# Patient Record
Sex: MALE | Race: WHITE
[De-identification: names, ages, dates, MRNs, and addresses within clinical notes are randomized per-mention and may not be internally consistent; named-entity substitution may affect disease eponyms.]

---

## 2017-03-20 ENCOUNTER — HOSPITAL ENCOUNTER (INPATIENT)
Dept: HOSPITAL 42 - ED | Age: 62
LOS: 3 days | Discharge: HOME | DRG: 138 | End: 2017-03-23
Attending: INTERNAL MEDICINE | Admitting: INTERNAL MEDICINE
Payer: MEDICAID

## 2017-03-20 VITALS — BODY MASS INDEX: 25.8 KG/M2

## 2017-03-20 DIAGNOSIS — J44.9: ICD-10-CM

## 2017-03-20 DIAGNOSIS — I10: ICD-10-CM

## 2017-03-20 DIAGNOSIS — Z79.84: ICD-10-CM

## 2017-03-20 DIAGNOSIS — R10.11: ICD-10-CM

## 2017-03-20 DIAGNOSIS — K29.70: ICD-10-CM

## 2017-03-20 DIAGNOSIS — K59.00: ICD-10-CM

## 2017-03-20 DIAGNOSIS — R00.1: Primary | ICD-10-CM

## 2017-03-20 DIAGNOSIS — R42: ICD-10-CM

## 2017-03-20 DIAGNOSIS — I25.10: ICD-10-CM

## 2017-03-20 DIAGNOSIS — Z82.49: ICD-10-CM

## 2017-03-20 DIAGNOSIS — D72.829: ICD-10-CM

## 2017-03-20 DIAGNOSIS — J45.909: ICD-10-CM

## 2017-03-20 DIAGNOSIS — T44.7X5A: ICD-10-CM

## 2017-03-20 DIAGNOSIS — E11.9: ICD-10-CM

## 2017-03-20 DIAGNOSIS — E87.5: ICD-10-CM

## 2017-03-20 DIAGNOSIS — F10.10: ICD-10-CM

## 2017-03-20 DIAGNOSIS — F17.210: ICD-10-CM

## 2017-03-20 DIAGNOSIS — Z79.82: ICD-10-CM

## 2017-03-20 DIAGNOSIS — D64.9: ICD-10-CM

## 2017-03-20 LAB
ADD MANUAL DIFF?: NO
ALBUMIN/GLOB SERPL: 1.4 {RATIO} (ref 1.1–1.8)
ALP SERPL-CCNC: 72 U/L (ref 38–133)
ALT SERPL-CCNC: 43 U/L (ref 7–56)
APPEARANCE UR: (no result)
APTT BLD: 21.5 SECONDS (ref 23.7–30.8)
AST SERPL-CCNC: 51 U/L (ref 15–59)
BACTERIA #/AREA URNS HPF: (no result) /[HPF]
BASE EXCESS BLDV CALC-SCNC: -3.2 MMOL/L (ref 0–2)
BASOPHILS # BLD AUTO: 0.02 K/MM3 (ref 0–2)
BASOPHILS NFR BLD: 0.1 % (ref 0–3)
BILIRUB SERPL-MCNC: 1 MG/DL (ref 0.2–1.3)
BILIRUB UR-MCNC: NEGATIVE MG/DL
BUN SERPL-MCNC: 20 MG/DL (ref 7–21)
BUN SERPL-MCNC: 27 MG/DL (ref 7–21)
CALCIUM SERPL-MCNC: 8.6 MG/DL (ref 8.4–10.5)
CALCIUM SERPL-MCNC: 9.2 MG/DL (ref 8.4–10.5)
CHLORIDE SERPL-SCNC: 102 MMOL/L (ref 98–107)
CHLORIDE SERPL-SCNC: 104 MMOL/L (ref 98–107)
CO2 SERPL-SCNC: 19 MMOL/L (ref 21–33)
CO2 SERPL-SCNC: 24 MMOL/L (ref 21–33)
COLOR UR: YELLOW
EOSINOPHIL # BLD: 0.1 10*3/UL (ref 0–0.7)
EOSINOPHIL NFR BLD: 0.5 % (ref 1.5–5)
EPI CELLS #/AREA URNS HPF: (no result) /HPF (ref 0–5)
ERYTHROCYTE [DISTWIDTH] IN BLOOD BY AUTOMATED COUNT: 19 % (ref 11.5–14.5)
GLOBULIN SER-MCNC: 3 GM/DL
GLUCOSE SERPL-MCNC: 123 MG/DL (ref 70–110)
GLUCOSE SERPL-MCNC: 303 MG/DL (ref 70–110)
GLUCOSE UR STRIP-MCNC: NEGATIVE MG/DL
GRANULOCYTES # BLD: 12.99 10*3/UL (ref 1.4–6.5)
GRANULOCYTES NFR BLD: 72.4 % (ref 50–68)
HCT VFR BLD CALC: 33.8 % (ref 42–52)
INR PPP: 1.01 (ref 0.93–1.08)
KETONES UR STRIP-MCNC: (no result) MG/DL
LEUKOCYTE ESTERASE UR-ACNC: NEGATIVE LEU/UL
LYMPHOCYTES # BLD: 3.5 10*3/UL (ref 1.2–3.4)
LYMPHOCYTES NFR BLD AUTO: 19.7 % (ref 22–35)
MCH RBC QN AUTO: 20.4 PG (ref 25–35)
MCHC RBC AUTO-ENTMCNC: 29 G/DL (ref 31–37)
MCV RBC AUTO: 70.4 FL (ref 80–105)
MONOCYTES # BLD AUTO: 1.3 10*3/UL (ref 0.1–0.6)
MONOCYTES NFR BLD: 7.3 % (ref 1–6)
PH BLDV: 7.21 [PH] (ref 7.32–7.43)
PH UR STRIP: 5.5 [PH] (ref 4.7–8)
PLATELET # BLD: 498 10^3/UL (ref 120–450)
PMV BLD AUTO: 9.4 FL (ref 7–11)
POTASSIUM SERPL-SCNC: 5.2 MMOL/L (ref 3.6–5)
POTASSIUM SERPL-SCNC: 6.3 MMOL/L (ref 3.6–5)
PROT SERPL-MCNC: 7.4 G/DL (ref 5.8–8.3)
PROT UR STRIP-MCNC: 30 MG/DL
RBC # UR STRIP: NEGATIVE /UL
RBC #/AREA URNS HPF: (no result) /HPF (ref 0–2)
SODIUM SERPL-SCNC: 138 MMOL/L (ref 132–148)
SODIUM SERPL-SCNC: 140 MMOL/L (ref 132–148)
SP GR UR STRIP: >= 1.03 (ref 1–1.03)
TROPONIN I SERPL-MCNC: < 0.01 NG/ML
TROPONIN I SERPL-MCNC: < 0.01 NG/ML
UROBILINOGEN UR STRIP-ACNC: 0.2 E.U./DL
WBC # BLD AUTO: 18 10^3/UL (ref 4.5–11)

## 2017-03-20 RX ADMIN — INSULIN HUMAN SCH: 100 INJECTION, SOLUTION PARENTERAL at 17:50

## 2017-03-20 RX ADMIN — INSULIN HUMAN SCH: 100 INJECTION, SOLUTION PARENTERAL at 22:00

## 2017-03-20 NOTE — CARD
--------------- APPROVED REPORT --------------





EKG Measurement

Heart [id number]

[id number]

[id number]

[id number]



<Conclusion>

Sinus bradycardia with Junctional escape rhythm

Right bundle branch block

Abnormal ECG

## 2017-03-20 NOTE — RAD
HISTORY:

cough  



COMPARISON:

Comparison is made to 12/23/2016 



FINDINGS:



LUNGS:

Interval appearance of mild pulmonary vascular congestion compared to 

the previous exam.  Otherwise no significant interval change.



PLEURA:

No significant pleural effusion identified, no pneumothorax apparent.



CARDIOVASCULAR:

Normal.



OSSEOUS STRUCTURES:

No significant abnormalities.



VISUALIZED UPPER ABDOMEN:

Normal.



OTHER FINDINGS:

None.



IMPRESSION:

No radiographic evidence of pneumonia. Mild pulmonary vascular 

congestion.

## 2017-03-20 NOTE — CARD
--------------- APPROVED REPORT --------------





EKG Measurement

Heart [id number]

[id number]

[id number]

[id number]

[id number]



<Conclusion>

Marked sinus bradycardia

Right bundle branch block

Abnormal ECG

## 2017-03-20 NOTE — US
HISTORY:

RUQ abd pain



COMPARISON:

CT abdomen and pelvis with IV contrast performed 12/23/16



TECHNIQUE:

Sonographic evaluation of the abdomen.



FINDINGS:



LIVER:

Measures 19.5 cm in sagittal dimension. Echogenic liver may be seen 

in setting of hepatic parenchymal disease or fatty infiltration.  No 

focal hepatic mass identified. The main portal vein appears patent 

with normal directional flow.   No intrahepatic bile duct dilatation.



GALLBLADDER:

Mild pericholecystic edema. No gallstones. Gallbladder wall appears 

top normal in thickness measuring approximately 3 mm. Negative 

sonographic Ramey's sign as assessed by the sonographer.



COMMON BILE DUCT:

Measures 2 mm. 



PANCREAS:

Not well visualized.



RIGHT KIDNEY:

Measures 10.3 x 5.0 x 5.5 cm. No obstructing calculus or 

hydronephrosis identified. 



LEFT KIDNEY:

Measures 11.3 x 6.6 x 5.5 cm. 2.5 x 2.8 x 2.4 cm mid/lateral pole 

hypodensity with minimal internal echoes, possibly mildly complex 

cyst.  No obstructing calculus or hydronephrosis identified. 



SPLEEN:

Measures approximately 10.9 cm. 



AORTA:

Limited views appear unremarkable. 



IVC:

Limited views appear unremarkable. 



OTHER FINDINGS:

None. 



IMPRESSION:

Hepatomegaly.



Echogenic liver may be seen in setting of hepatic parenchymal disease 

or fatty infiltration.



2.8 cm left renal hypodensity with minimal internal echoes, possibly 

mildly complex cyst. 



Mild pericholecystic edema. No evidence of gallstones. Negative 

sonographic Ramey's sign as assessed by the sonographer.  Correlate 

clinically.

## 2017-03-20 NOTE — CP.PCM.HP
<Jovany Meza - Last Filed: 03/20/17 16:34>





History of Present Illness





- History of Present Illness


History of Present Illness: 


CC: Pre-syncope, Bradycardia





62yo M with PMHx of CAD, HTN, DM, Asthma here for evaluation after dizziness, 

fall, pre-syncope. Patient history obtained from family. Patient had returned 

from work and after using the restroom, the family heard a loud thud on the 

floor. Patient was found to be on the floor and he seemed to be very pale as 

per family. Patient c/o chest pain, 8/10 in severity, tight in quality, does 

not radiate, also had shortness of breath, no diaphoresis, no alleviating or 

exacerbating factors. Patient also c/o RUQ pain that started around the same 

time and he had one episode of clear emesis just prior to arrival to the ER. 

Patient's family called the EMS and upon arrival, patient was found to have HR 

in 20s. Patient was given IM Ketamine and a transcutaneous pacemaker was 

started. Patient takes metoprolol 50mg PO BID at home. He last took his 

medication this morning. Patient denies any similar symptoms in the past. He 

denies any bowel or bladder incontinence. No tongue biting. No trauma. No Fever

, no chills. No recent illness. No sick contacts. 





PMHx: CAD, HTN, DM, Asthma


PSHx: R Inguinal Hernia repair, Colonoscopy w/ polypectomy


Family Hx: Mother - CAD


Social Hx: Current 4cigs/day smoker. No ETOH. No Drugs. Works as a 


[employer]


Meds: Metoprolol 50mg PO BID, Metformin 1000mg PO BID, Aspirin 81mg PO Daily, 

Omeprazle 40mg PO Daily








Present on Admission





- Present on Admission


Any Indicators Present on Admission: No





Review of Systems





- Review of Systems


All systems: reviewed and no additional remarkable complaints except





- Constitutional


Constitutional: absent: Chills, Fever





- EENT


Eyes: absent: Blurred Vision, Change in Vision


Ears: absent: Ear Discharge, Disequilibrium


Nose/Mouth/Throat: absent: Epistaxis





- Cardiovascular


Cardiovascular: Chest Pain, Dyspnea.  absent: Diaphoresis, Radiating Pain





- Respiratory


Respiratory: Dyspnea.  absent: Cough





- Gastrointestinal


Gastrointestinal: Abdominal Pain, Nausea, Vomiting.  absent: Diarrhea





- Genitourinary


Genitourinary: absent: Difficulty Urinating





- Musculoskeletal


Musculoskeletal: absent: Abnormal Gait, Back Pain





- Neurological


Neurological: absent: Confusion





- Psychiatric


Psychiatric: absent: Depression





Past Patient History





- Infectious Disease


Hx of Infectious Diseases: None





- Tetanus Immunizations


Tetanus Immunization: Unknown





- Past Social History


Smoking Status: Former Smoker





- CARDIAC


Hx Cardiac Disorders: Yes


Hx Hypertension: Yes





- PULMONARY


Hx Respiratory Disorders: Yes


Hx Asthma: Yes





- NEUROLOGICAL


Hx Neurological Disorder: No





- HEENT


Hx HEENT Problems: Yes


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





- RENAL


Hx Chronic Kidney Disease: No





- ENDOCRINE/METABOLIC


Hx Endocrine Disorders: Yes


Hx Diabetes Mellitus Type 2: Yes (iddm)





- HEMATOLOGICAL/ONCOLOGICAL


Hx Blood Disorders: No





- INTEGUMENTARY


Hx Dermatological Problems: No





- MUSCULOSKELETAL/RHEUMATOLOGICAL


Hx Musculoskeletal Disorders: No





- GASTROINTESTINAL


Hx Gastrointestinal Disorders: No





- GENITOURINARY/GYNECOLOGICAL


Hx Genitourinary Disorders: No





- PSYCHIATRIC


Hx Psychophysiologic Disorder: No


Hx Substance Use: No





- SURGICAL HISTORY


Hx Cardiac Catheterization: Yes (negative 6/2016)


Other/Comment: colonoscopy/endoscopy





- ANESTHESIA


Hx Anesthesia: Yes


Hx Anesthesia Reactions: No


Hx Malignant Hyperthermia: No





Meds


Allergies/Adverse Reactions: 


 Allergies











Allergy/AdvReac Type Severity Reaction Status Date / Time


 


No Known Allergies Allergy   Verified 03/20/17 13:41














Physical Exam





- Constitutional


Appears: Well, No Acute Distress





- Head Exam


Head Exam: ATRAUMATIC, NORMAL INSPECTION, NORMOCEPHALIC





- Eye Exam


Eye Exam: EOMI, Normal appearance, PERRL.  absent: Scleral icterus


Pupil Exam: PERRL





- ENT Exam


ENT Exam: Mucous Membranes Moist, Normal Exam





- Neck Exam


Neck exam: Positive for: Normal Inspection





- Respiratory Exam


Respiratory Exam: Clear to Auscultation Bilateral, NORMAL BREATHING PATTERN.  

absent: Wheezes





- Cardiovascular Exam


Cardiovascular Exam: Bradycardia, +S1, +S2.  absent: Diastolic murmur, Systolic 

Murmur





- GI/Abdominal Exam


GI & Abdominal Exam: Normal Bowel Sounds, Soft, Tenderness (RUQ tender to 

palpation.).  absent: Distended, Rebound, Rigid





- Extremities Exam


Extremities exam: Positive for: normal inspection, pedal pulses present.  

Negative for: calf tenderness, tenderness





- Back Exam


Back exam: NORMAL INSPECTION





- Neurological Exam


Neurological exam: Alert, CN II-XII Intact, Oriented x3





- Psychiatric Exam


Psychiatric exam: Normal Affect, Normal Mood





- Skin


Skin Exam: Dry, Intact, Normal Color, Warm





Results





- Vital Signs


Recent Vital Signs: 





 Last Vital Signs











Temp  94.2 F L  03/20/17 13:49


 


Pulse  44 L  03/20/17 13:49


 


Resp      


 


BP  151/56 H  03/20/17 13:49


 


Pulse Ox  100   03/20/17 13:49














- Labs


Result Diagrams: 


 03/20/17 13:55





 03/20/17 13:55


Labs: 





 Laboratory Results - last 24 hr











  03/20/17





  13:55


 


WBC  18.0 H D


 


RBC  4.80


 


Hgb  9.8 L


 


Hct  33.8 L


 


MCV  70.4 L


 


MCH  20.4 L


 


MCHC  29.0 L


 


RDW  19.0 H


 


Plt Count  498 H


 


MPV  9.4


 


Gran %  72.4 H


 


Lymph % (Auto)  19.7 L


 


Mono % (Auto)  7.3 H


 


Eos % (Auto)  0.5 L


 


Baso % (Auto)  0.1


 


Gran #  12.99 H


 


Lymph #  3.5 H


 


Mono #  1.3 H


 


Eos #  0.1


 


Baso #  0.02


 


PT  10.9


 


INR  1.01


 


APTT  21.5 L


 


Sodium  138


 


Potassium  5.2 H


 


Chloride  102


 


Carbon Dioxide  19 L


 


Anion Gap  22 H


 


BUN  20


 


Creatinine  1.4


 


Est GFR ( Amer)  > 60


 


Est GFR (Non-Af Amer)  51


 


Random Glucose  303 H* D


 


Calcium  9.2


 


Total Bilirubin  1.0


 


AST  51


 


ALT  43


 


Alkaline Phosphatase  72


 


Lactate Dehydrogenase  511


 


Total Creatine Kinase  86


 


Troponin I  < 0.01  D


 


NT-Pro-B Natriuret Pep  819 H


 


Total Protein  7.4


 


Albumin  4.3


 


Globulin  3.0


 


Albumin/Globulin Ratio  1.4














Assessment & Plan





- Assessment and Plan (Free Text)


Assessment: 


62yo M with PMHx of CAD, HTN, DM, Asthma here for evaluation of Bradycardia, Pre

-syncope





1. Pre-syncope; Atypical Chest pain


Denies any trauma


likely secondary to Bradycardia


normotensive


Consider medication (Metoprolol) Toxicity


Hold metoprolol


CXR - no active disease


EKG - Eliel @ 40; Possible 3rd degree heart block present. Few missed p-waves. 

No ST changes


Cardiology Consulted, Dr. Cortés, appreciate recs


f/u TSH


f/u Utox


f/u Troponins


f/u Urine and Blood Cxs


f/u UA


f/u ECHO


ASA 81mg PO Daily


Tylenol 650mg PO q6 prn pain mild





2. RUQ Abd pain


Leukocytosis


Afebrile


No increased LFTs


f/u Abd US


f/u Procalcitonin


NPO except ice chips


Zofran 4mg q6 prn





3. Hx of DM


Insulin Medium dose SS


Accuchecks


Hold home metformin





4. Hx of HTN


Continue to monitor


Hold metoprolol secondary to above





5. PPx


SCDs


Protonix 40mg PO Daily





Discussed case with Dr. Ellen Meza PGY1


[phone]





<Hanna Hughes MD - Last Filed: 03/21/17 12:18>





Results





- Vital Signs


Recent Vital Signs: 





 Last Vital Signs











Temp  98.4 F   03/21/17 04:00


 


Pulse  87   03/21/17 08:00


 


Resp  16   03/21/17 08:00


 


BP  135/63   03/21/17 08:00


 


Pulse Ox  100   03/21/17 08:00














- Labs


Result Diagrams: 


 03/21/17 06:20





 03/21/17 06:20


Labs: 





 Laboratory Results - last 24 hr











  03/20/17 03/20/17 03/20/17





  21:05 22:03 23:15


 


WBC   


 


RBC   


 


Hgb   


 


Hct   


 


MCV   


 


MCH   


 


MCHC   


 


RDW   


 


Plt Count   


 


MPV   


 


Gran %   


 


Lymph % (Auto)   


 


Mono % (Auto)   


 


Eos % (Auto)   


 


Baso % (Auto)   


 


Gran #   


 


Lymph #   


 


Mono #   


 


Eos #   


 


Baso #   


 


PT   


 


INR   


 


APTT   


 


pO2  31  


 


VBG pH  7.21 L  


 


VBG pCO2  65.0 H  


 


VBG HCO3  26.0  


 


VBG Total CO2  28.0  


 


VBG O2 Sat (Calc)  50.1  


 


VBG Base Excess  -3.2 L  


 


VBG Potassium  6.2 H*  


 


Sodium  140  


 


Chloride  104  


 


Glucose  124 H  


 


Lactate  4.2 H*  


 


FiO2  21.0  


 


Potassium  6.3 H* D  


 


Carbon Dioxide  24  


 


Anion Gap  18  


 


BUN  27 H  


 


Creatinine  1.1  


 


Est GFR ( Amer)  > 60  


 


Est GFR (Non-Af Amer)  > 60  


 


POC Glucose (mg/dL)   191 H 


 


Random Glucose  123 H  


 


Calcium  8.6  


 


Phosphorus   


 


Magnesium  1.9  


 


Total Bilirubin   


 


AST   


 


ALT   


 


Alkaline Phosphatase   


 


Troponin I  < 0.01  


 


Total Protein   


 


Albumin   


 


Globulin   


 


Albumin/Globulin Ratio   


 


Venous Blood Potassium  6.2 H*  


 


Urine Color    Yellow


 


Urine Appearance    Sl cloudy


 


Urine pH    5.5


 


Ur Specific Gravity    >= 1.030


 


Urine Protein    30 H


 


Urine Glucose (UA)    Negative


 


Urine Ketones    Trace H


 


Urine Blood    Negative


 


Urine Nitrate    Negative


 


Urine Bilirubin    Negative


 


Urine Urobilinogen    0.2


 


Ur Leukocyte Esterase    Negative


 


Urine RBC    0 - 2


 


Urine WBC    1 - 3


 


Ur Epithelial Cells    0 - 2


 


Urine Bacteria    Rare


 


Ur Random Sodium    51


 


Ur Random Potassium    92.4


 


Urine Opiates Screen    Negative


 


Urine Methadone Screen    Negative


 


Ur Barbiturates Screen    Negative


 


Ur Phencyclidine Scrn    Negative


 


Ur Amphetamines Screen    Negative


 


U Benzodiazepines Scrn    Negative


 


U Oth Cocaine Metabols    Negative


 


U Cannabinoids Screen    Negative














  03/21/17 03/21/17 03/21/17





  01:10 06:20 08:45


 


WBC   11.2 H D 


 


RBC   3.98 


 


Hgb   8.0 L 


 


Hct   27.0 L 


 


MCV   67.8 L 


 


MCH   20.1 L 


 


MCHC   29.6 L 


 


RDW   18.7 H 


 


Plt Count   388 


 


MPV   9.2 


 


Gran %   69.5 H 


 


Lymph % (Auto)   23.4 


 


Mono % (Auto)   6.6 H 


 


Eos % (Auto)   0.4 L 


 


Baso % (Auto)   0.1 


 


Gran #   7.76 H 


 


Lymph #   2.6 


 


Mono #   0.7 H 


 


Eos #   0.1 


 


Baso #   0.01 


 


PT   11.7 


 


INR   1.08 


 


APTT   24.0 


 


pO2  44   36


 


VBG pH  7.34   7.31 L


 


VBG pCO2  51.0   57.0


 


VBG HCO3  27.5   28.7 H


 


VBG Total CO2  29.1 H   30.4 H


 


VBG O2 Sat (Calc)  75.6 H   56.4


 


VBG Base Excess  0.9   1.2


 


VBG Potassium  4.2   4.2


 


Sodium  141.0  142  141.0


 


Chloride  113.0 H  105  109.0 H


 


Glucose  82   198 H


 


Lactate  2.3 H   3.1 H


 


FiO2  21.0   21.0


 


Potassium   3.8 


 


Carbon Dioxide   25 


 


Anion Gap   16 


 


BUN   20 


 


Creatinine   0.9 


 


Est GFR ( Amer)   > 60 


 


Est GFR (Non-Af Amer)   > 60 


 


POC Glucose (mg/dL)   


 


Random Glucose   97 


 


Calcium   8.3 L 


 


Phosphorus   4.5 


 


Magnesium   1.8 


 


Total Bilirubin   0.4 


 


AST   141 H 


 


ALT   117 H 


 


Alkaline Phosphatase   63 


 


Troponin I   0.02  D 


 


Total Protein   6.2 


 


Albumin   3.4 


 


Globulin   2.8 


 


Albumin/Globulin Ratio   1.2 


 


Venous Blood Potassium  4.2   4.2


 


Urine Color   


 


Urine Appearance   


 


Urine pH   


 


Ur Specific Gravity   


 


Urine Protein   


 


Urine Glucose (UA)   


 


Urine Ketones   


 


Urine Blood   


 


Urine Nitrate   


 


Urine Bilirubin   


 


Urine Urobilinogen   


 


Ur Leukocyte Esterase   


 


Urine RBC   


 


Urine WBC   


 


Ur Epithelial Cells   


 


Urine Bacteria   


 


Ur Random Sodium   


 


Ur Random Potassium   


 


Urine Opiates Screen   


 


Urine Methadone Screen   


 


Ur Barbiturates Screen   


 


Ur Phencyclidine Scrn   


 


Ur Amphetamines Screen   


 


U Benzodiazepines Scrn   


 


U Oth Cocaine Metabols   


 


U Cannabinoids Screen   














Attending/Attestation





- Attestation


I have personally seen and examined this patient.: Yes


I have fully participated in the care of the patient.: Yes


I have reviewed all pertinent clinical information: Yes


Notes (Text): 


Patient was seen and examined with medical resident .Agreed with resident 

assessment and plan.





61 Yrs old male with PMH of HTN,Obesity, SP cardiac cath last years, reveal non 

obstructive CAD is admitted with dizziness, syncope  was found to have 

bradycardia,HR was in upper 20 , in ER Heart rate was around 38, was also found 

to have  AV dissociation transiently , was given atropine, patient was on beta 

blocker, will hold betablocker, will monitor patient closely in ICU.Cardiology 

is consulted.


Chest pain is atypical, has chest wall tenderness, will get serial troponin.


Leukocytosis, etiology unclear, has mild right upper quadrant tenderness, will 

get right bupper quadrant USG.We will follow up cultures.





Management plan was discussed in detail with patient


 Education was provided.

## 2017-03-20 NOTE — ED PDOC
Arrival/HPI





- General


Chief Complaint: Chest Pain


Time Seen by Provider: 03/20/17 13:42


Historian: Family, EMS


EM Caveat: Unstable Vital Signs





- History of Present Illness


Narrative History of Present Illness (Text): 


61-year-old male presents Emergency Department via EMS vith bradycardia. 

Patient was complaining of chest pain which started earlier today. EMS found 

patient bradycardic around 20 bpm, and unable to obtain IV access, he was given 

IM ketamine for sedation and paced transcutaneously at 70 bpm. Patient's family 

reported he has a history of IV drug abuse, but has been not use any drugs in 

the last 20 years. Also reported that he takes metformin, metoprolol, and Plavix

, and they are the ones administer him medications. 





Past Medical History





- Provider Review


Nursing Documentation Reviewed: Yes





- Infectious Disease


Hx of Infectious Diseases: None





- Tetanus Immunization


Tetanus Immunization: Unknown





- Cardiac


Hx Cardiac Disorders: Yes


Hx Hypertension: Yes





- Pulmonary


Hx Respiratory Disorders: Yes


Hx Asthma: Yes





- Neurological


Hx Neurological Disorder: No





- HEENT


Hx HEENT Disorder: Yes


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





- Renal


Hx Renal Disorder: No





- Endocrine/Metabolic


Hx Endocrine Disorders: Yes


Hx Diabetes Mellitus Type 2: Yes (iddm)





- Hematological/Oncological


Hx Blood Disorders: No





- Integumentary


Hx Dermatological Disorder: No





- Musculoskeletal/Rheumatological


Hx Musculoskeletal Disorders: No





- Gastrointestinal


Hx Gastrointestinal Disorders: No





- Genitourinary/Gynecological


Hx Genitourinary Disorders: No





- Psychiatric


Hx Psychophysiologic Disorder: No


Hx Substance Use: No





- Surgical History


Hx Cardiac Catheterization: Yes (negative 6/2016)


Other/Comment: colonoscopy/endoscopy





- Anesthesia


Hx Anesthesia: Yes


Hx Anesthesia Reactions: No


Hx Malignant Hyperthermia: No





- Suicidal Assessment


Feels Threatened In Home Enviroment: No





Family/Social History


Family/Social History: Unknown Family HX


Smoking Status: Former Smoker


Hx Alcohol Use: No


Hx Substance Use: No


Hx Substance Use Treatment: No





Allergies/Home Meds


Allergies/Adverse Reactions: 


Allergies





No Known Allergies Allergy (Verified 03/20/17 13:41)


 








Home Medications: 


 Home Meds











 Medication  Instructions  Recorded  Confirmed


 


MetFORMIN [glucoPHAGE] 1,000 mg PO BID 03/29/16 03/20/17


 


Aspirin [Ecotrin] 81 mg PO DAILY 06/17/16 03/20/17


 


Metoprolol Tartrate 50 mg PO BID 06/17/16 03/20/17


 


Omeprazole 40 mg PO DAILY 12/23/16 03/20/17














Review of Systems





- Review of Systems


Systems not reviewed;Unavailable: Acuity of Condition





Physical Exam


Vital Signs Reviewed: Yes


Vital Signs











  Temp Pulse BP Pulse Ox


 


 03/20/17 13:49  94.2 F L  44 L  151/56 H  100











Temperature: Afebrile


Blood Pressure: Hypertensive


Pulse: Bradycardic


Respiratory Rate: Normal


Appearance: Positive for: Non-Toxic


Pain Distress: None


Mental Status: Positive for: Alert and Oriented X 3





- Systems Exam


Head: Present: Atraumatic, Normocephalic


Pupils: Present: PERRL


Extroacular Muscles: Present: EOMI


Conjunctiva: Present: Normal


Mouth: Present: Moist Mucous Membranes


Neck: Present: Normal Range of Motion.  No: MIDLINE TENDERNESS, Paraspinal 

Tenderness


Respiratory/Chest: Present: Clear to Auscultation, Good Air Exchange.  No: 

Respiratory Distress, Accessory Muscle Use


Cardiovascular: Present: Normal S1, S2, Bradycardic.  No: Murmurs


Abdomen: Present: Normal Bowel Sounds.  No: Tenderness, Distention, Peritoneal 

Signs, Rebound, Guarding


Back: Present: Normal Inspection


Upper Extremity: Present: Normal Inspection.  No: Cyanosis, Edema


Lower Extremity: Present: Normal Inspection.  No: Edema


Neurological: Present: GCS=15, CN II-XII Intact, Speech Normal, Other (No focal 

neurological deficits)


Skin: Present: Warm, Dry, Normal Color.  No: Rashes


Psychiatric: Present: Alert, Oriented x 3, Normal Insight, Normal 

Concentration.  No: Anxious, Agitated





Medical Decision Making


ED Course and Treatment: 


03/20/17 13:55


Impression:


61-year-old male with bradycardia.


Seen immediately on arrival.


IV access obtained


Patient's repeat heart rate was around 50s on the monitor. No hypotension.


Patient complaining of chest discomfort


No ST elevation MI on EKG





Differential Diagnosis included but are not limited to:  Bradycardia due to an 

STEMI versus beta blocker toxicity


assess and disposition





Progress Notes:


EKG shows sinus bradycardia at 50 BPM with no ST-segment elevations, normal 

intervals. Interpreted by me.





03/20/17 14:32


Patient's repeat EKG shows sinus bradycardia, 40 bpm, sinus. Interpreted by me.





03/20/17 14:55


Case discussed with Dr. Edwards, who accepts patient to the ICU.


Dr. Moo cordoba, awaiting callback





03/20/17 15:30


dw Dr. Cortés, recommends atropine


pt in no distress at this time


pt and family aware of and agree with plan





- Critical Care


Critical Care Minutes: 30 minutes





- Lab Interpretations


Lab Results: 








 03/20/17 13:55 





 03/20/17 13:55 





 Lab Results





03/20/17 13:55: WBC 18.0 H D, RBC 4.80, Hgb 9.8 L, Hct 33.8 L, MCV 70.4 L, MCH 

20.4 L, MCHC 29.0 L, RDW 19.0 H, Plt Count 498 H, MPV 9.4, Gran % 72.4 H, Lymph 

% (Auto) 19.7 L, Mono % (Auto) 7.3 H, Eos % (Auto) 0.5 L, Baso % (Auto) 0.1, 

Gran # 12.99 H, Lymph # 3.5 H, Mono # 1.3 H, Eos # 0.1, Baso # 0.02, PT 10.9, 

INR 1.01, APTT 21.5 L, Sodium 138, Potassium 5.2 H, Chloride 102, Carbon 

Dioxide 19 L, Anion Gap 22 H, BUN 20, Creatinine 1.4, Est GFR (African Amer) > 

60, Est GFR (Non-Af Amer) 51, Random Glucose 303 H* D, Calcium 9.2, Total 

Bilirubin 1.0, AST 51, ALT 43, Alkaline Phosphatase 72, Lactate Dehydrogenase 

511, Total Creatine Kinase 86, Troponin I < 0.01  D, NT-Pro-B Natriuret Pep 819 

H, Total Protein 7.4, Albumin 4.3, Globulin 3.0, Albumin/Globulin Ratio 1.4











- RAD Interpretation


Radiology Orders: 








03/20/17 13:44


CHEST PORTABLE [RAD] Stat 














- Medication Orders


Current Medication Orders: 








Sodium Chloride (Sodium Chloride 0.9%)  2,000 mls @ 1,000 mls/hr IV .Q2H STA


   Stop: 03/20/17 15:42


   Last Admin: 03/20/17 14:00  Dose: 1,000 MLS/HR





eMAR Start Stop


 Document     03/20/17 14:00  REEDMEHNAZ  (Rec: 03/20/17 15:19  REED  [id number])


     Intravenous Solution


      Start Date                                 03/20/17


      Start Time                                 14:00





Vancomycin HCl (Vancomycin 1gm)  250 mls @ 133.333 mls/hr IVPB STAT STA


   PRN Reason: Protocol


   Stop: 03/20/17 16:46





Discontinued Medications





Albuterol/Ipratropium (Duoneb 3 Mg/0.5 Mg (3 Ml) Ud)  3 ml IH STAT STA


   Stop: 03/20/17 14:55


Aspirin (Aspirin Supp)  300 mg RC STAT STA


   Stop: 03/20/17 13:47


   Last Admin: 03/20/17 15:19  Dose:  





Atropine Sulfate (Atropine)  0.5 mg IVP STAT STA


   Stop: 03/20/17 14:51


Glucagon (Glucagen Diagnostic Kit)  1 mg IV STAT STA


   Stop: 03/20/17 14:53


Piperacillin Sod/Tazobactam Sod (Zosyn 4.5 Gm In Ns 100ml)  100 mls @ 200 mls/

hr IVPB STAT STA


   PRN Reason: Protocol


   Stop: 03/20/17 15:23


Ondansetron HCl (Zofran Inj)  4 mg IVP STAT STA


   Stop: 03/20/17 13:57


   Last Admin: 03/20/17 14:00  Dose: 4 MG





IVP Administration


 Document     03/20/17 14:00  PORTILLO  (Rec: 03/20/17 15:20  REED  [id number])


     Charges for Administration


      # of IVP Administrations                   1





Ondansetron HCl (Zofran Inj) Confirm Administered Dose 4 mg .ROUTE .STK-MED ONE


   Stop: 03/20/17 14:00


   Last Admin: 03/20/17 15:20  Dose:  











Disposition/Present on Arrival





- Present on Arrival


Any Indicators Present on Arrival: No


History of DVT/PE: No


History of Uncontrolled Diabetes: No


Urinary Catheter: No


History of Decub. Ulcer: No


History Surgical Site Infection Following: None





- Disposition


Have Diagnosis and Disposition been Completed?: Yes


Diagnosis: 


 Bradycardia


Disposition: HOSPITALIZED


Disposition Time: 15:32


Patient Plan: Admission


Patient Problems: 


 Current Active Problems











Problem Status Diagnosed


 


Bradycardia Acute 











Condition: CRITICAL


Referrals: 


Bre Morrison DO [Primary Care Provider] - Follow up with primary

## 2017-03-20 NOTE — CP.PCM.CON
<Lc Sanchez - Last Filed: 03/20/17 18:07>





History of Present Illness





- History of Present Illness


History of Present Illness: 


60yo M with PMHx of CAD, HTN, DM, Asthma came to ED by ambulence after dizziness

, fall, syncope. Patient history obtained from family. Patient had returned 

from work and after using the restroom, the family heard a loud noise on the 

floor. Patient was found to be on the floor and he seemed to be very pale as 

per family. Patient c/o chest pain, 8/10 in severity, tight pressure like in 

quality. Reports that he has been having sturnal/epigastric pain for a few 

month and radiates to R arm accompanied by N/V. .  Reports shortness of breath, 

no hematemesis, no hemoptesis, no diaphoresis, no alleviating or exacerbating 

factors. Pt is a heavy smoker and reports unintensional weight loss of 20lbs in 

last few months. Patient's family called the EMS and upon arrival, patient was 

found to have HR in 20s. Patient was given IM Ketamine and a transcutaneous 

pacemaker at 70bmp was started. Patient takes metoprolol 50mg PO BID at home. 

He last took his medication this morning. Patient denies any similar symptoms 

in the past. He denies any bowel or bladder incontinence. No tongue biting. No 

trauma. No Fever. No recent illness. No sick contacts.  Denies h/o stroke or 

MI. 





PMHx: CAD, HTN, DM, Asthma


PSHx: R Inguinal Hernia repair, Colonoscopy w/ polypectomy


Family Hx: Mother - CAD


Social Hx: Current 40 pack year smoking hx. No ETOH. h/o Drugs use. Works as a 




Meds: Metoprolol 50mg PO BID, Metformin 1000mg PO BID, Aspirin 81mg PO Daily, 

Omeprazole 40mg PO Daily








Review of Systems





- Constitutional


Constitutional: Weakness.  absent: Anorexia, Chills, Fever, Night Sweats





- EENT


Eyes: absent: Blurred Vision, Change in Vision





- Cardiovascular


Cardiovascular: Chest Pain, Chest Pain at Rest, Dyspnea.  absent: Edema





- Respiratory


Respiratory: absent: Cough, Dyspnea, Hemoptysis





- Gastrointestinal


Gastrointestinal: Abdominal Pain, Bloating, Heartburn, Nausea, Vomiting.  absent

: Hematemesis, Hematochezia, Loose Stools, Melena





Past Patient History





- Infectious Disease


Hx of Infectious Diseases: None





- Tetanus Immunizations


Tetanus Immunization: Unknown





- Past Social History


Smoking Status: Former Smoker





- CARDIAC


Hx Cardiac Disorders: Yes


Hx Hypertension: Yes





- PULMONARY


Hx Respiratory Disorders: Yes


Hx Asthma: Yes





- NEUROLOGICAL


Hx Neurological Disorder: No





- HEENT


Hx HEENT Problems: Yes


Hx Deafness: Yes (right ear [place])





- RENAL


Hx Chronic Kidney Disease: No





- ENDOCRINE/METABOLIC


Hx Endocrine Disorders: Yes


Hx Diabetes Mellitus Type 2: Yes (iddm)





- HEMATOLOGICAL/ONCOLOGICAL


Hx Blood Disorders: No





- INTEGUMENTARY


Hx Dermatological Problems: No





- MUSCULOSKELETAL/RHEUMATOLOGICAL


Hx Musculoskeletal Disorders: No





- GASTROINTESTINAL


Hx Gastrointestinal Disorders: No





- GENITOURINARY/GYNECOLOGICAL


Hx Genitourinary Disorders: No





- PSYCHIATRIC


Hx Psychophysiologic Disorder: No


Hx Substance Use: No





- SURGICAL HISTORY


Hx Cardiac Catheterization: Yes (negative 6/2016)


Other/Comment: colonoscopy/endoscopy





- ANESTHESIA


Hx Anesthesia: Yes


Hx Anesthesia Reactions: No


Hx Malignant Hyperthermia: No





Meds


Allergies/Adverse Reactions: 


 Allergies











Allergy/AdvReac Type Severity Reaction Status Date / Time


 


No Known Allergies Allergy   Verified 03/20/17 13:41














- Medications


Medications: 


 Current Medications





Acetaminophen (Tylenol 325mg Tab)  650 mg PO Q6H PRN


   PRN Reason: Pain, Mild (1-3)


Aspirin (Ecotrin)  81 mg PO DAILY [facility]


Insulin Human Regular (Humulin R Med)  0 units SC ACHS [facility]


   PRN Reason: Protocol


Ondansetron HCl (Zofran Inj)  4 mg IVP Q6H PRN


   PRN Reason: Nausea/Vomiting


Pantoprazole Sodium (Protonix Ec Tab)  40 mg PO DAILY [facility]











Physical Exam





- Constitutional


Appears: No Acute Distress





- Head Exam


Head Exam: ATRAUMATIC, NORMAL INSPECTION, NORMOCEPHALIC





- Eye Exam


Eye Exam: EOMI, Normal appearance, PERRL


Pupil Exam: NORMAL ACCOMODATION, PERRL





- ENT Exam


ENT Exam: Mucous Membranes Moist, Normal Exam





- Neck Exam


Neck exam: Positive for: Normal Inspection





- Respiratory Exam


Respiratory Exam: NORMAL BREATHING PATTERN





- Cardiovascular Exam


Cardiovascular Exam: Bradycardia, +S1, +S2





- GI/Abdominal Exam


GI & Abdominal Exam: Soft, Tenderness.  absent: Distended, Firm, Guarding, 

Hernia





- Extremities Exam


Additional comments: 


clubbing





- Neurological Exam


Neurological exam: Alert, CN II-XII Intact, Normal Gait, Oriented x3, Reflexes 

Normal





- Psychiatric Exam


Psychiatric exam: Normal Affect, Normal Mood





- Skin


Skin Exam: Dry, Intact, Normal Color, Warm





Results





- Vital Signs


Recent Vital Signs: 


 Last Vital Signs











Temp  94.2 F L  03/20/17 13:49


 


Pulse  44 L  03/20/17 13:49


 


Resp      


 


BP  151/56 H  03/20/17 13:49


 


Pulse Ox  100   03/20/17 13:49














- Labs


Result Diagrams: 


 03/20/17 13:55





 03/20/17 13:55





Assessment & Plan





- Assessment and Plan (Free Text)


Assessment: 


60yo M with PMHx of CAD, HTN, DM, Asthma here for evaluation of Bradycardia, Pre

-syncope


Eliel cardia 


syncopy; Atypical Chest pain





Neuro: AAO x3, Syncopy 2/2 Bradycardia


-Neurocheck 


-Tylenol for pain 





CV: Bradycardia possible 2/2 beta blocker toxicity , normotensive


h/o HTN


EKG - sinus Eliel @ 40; Possible 3rd degree heart block present. Few missed p-

waves. No ST changes


-Cardiology following: Dr. Cortés rec: Atropin/Dopamin PRN , Possible cath 

tomorrow. 


-Hold beta blocker :Metoprolol


-f/u Echo 


-trend trop


-ASA PO


-Trancutaneous pacing 70bpm 


-Monitor VS 





Pulm: Maintain SaO2 90% + 


CXR - no active disease


-NC O2 


-f/u Chest CT w/o contrast 





GI: 


-f/u US reads/ procalcitonin


-PTX ppx 


-NPO 


-Zofran





Renal : Keep Euvolemia 


-f/u U tox/UA  





Endo : maintain euglycemia . h/o DM


-f/u TSH


-SSI 


-Accucheck


-Hold Metformin





ID: Leukocytosis 18k , Afebrile 


-Vanc/Zosyn


-f/u urine/blood cx


-Tylenol for fever 





DVT: SCD 





Dispo: Admit to ICU and monitor 





Case discussed with ICU attending 





<Grace RILEY,Mirela H - Last Filed: 03/20/17 18:24>





Meds





- Medications


Medications: 


 Current Medications





Acetaminophen (Tylenol 325mg Tab)  650 mg PO Q6H PRN


   PRN Reason: Pain, Mild (1-3)


Aspirin (Ecotrin)  81 mg PO DAILY [facility]


Vancomycin HCl (Vancomycin 1gm)  250 mls @ 167 mls/hr IVPB DAILY [facility]


   PRN Reason: Protocol


Piperacillin Sod/Tazobactam Sod (Zosyn 3.375 In Ns 100ml)  100 mls @ 200 mls/hr 

IVPB Q6 [facility]


   PRN Reason: Protocol


   Stop: 03/21/17 06:29


Insulin Human Regular (Humulin R Med)  0 units SC ACHS [facility]


   PRN Reason: Protocol


   Last Admin: 03/20/17 17:50 Dose:  Not Given


Ondansetron HCl (Zofran Inj)  4 mg IVP Q6H PRN


   PRN Reason: Nausea/Vomiting


Pantoprazole Sodium (Protonix Ec Tab)  40 mg PO DAILY [facility]











Results





- Vital Signs


Recent Vital Signs: 


 Last Vital Signs











Temp  94.2 F L  03/20/17 13:49


 


Pulse  44 L  03/20/17 13:49


 


Resp      


 


BP  151/56 H  03/20/17 13:49


 


Pulse Ox  100   03/20/17 13:49














- Labs


Result Diagrams: 


 03/20/17 13:55





 03/20/17 13:55





Attending/Attestation





- Attestation


I have personally seen and examined this patient.: Yes


I have fully participated in the care of the patient.: Yes


I have reviewed all pertinent clinical information: Yes


Notes (Text): 





03/20/17 18:20


60 y/o M w/ symptomatic bradycardia 


3 week hx of chest heavines and R arm pain. Signs and symptoms of unstable 

angina 


Currently HR 40's , recent use of metoprolol. Place pacing pads just in case. 

If needed dopamine can be used. Cardiology aware of the patient . 


Will need ECHO to r/o new WMA and EF


Trend troponins x 3, if increased start Heparin drip. 


Nausea/ leukocytosis - cx drawn , empiric abx started w/ Vanc and Zosyn 


Pt mentions 70 pk/ yr history and weight loss 3o lbs, cxr shows Hilar fullness 

. CT chest ordered. 


Hold all shaun blockers, could use Glucagon if further symptoms or bradycardia. 


dvt p heparin sq tid. 





cc time 65 min

## 2017-03-20 NOTE — CARD
--------------- APPROVED REPORT --------------





EKG Measurement

Heart [id number]

RI P56

[id number]

[id number]

[id number]



<Conclusion>

Marked sinus bradycardia with Junctional escape  rhythm

Right bundle branch block

Abnormal ECG

## 2017-03-20 NOTE — CT
EXAM:

  CT Chest Without Intravenous Contrast.



CLINICAL HISTORY:

  61 years old, male; Signs and symptoms; Shortness of breath; Additional info: 

Smoker, cp, SOB, hylar infiltrate



TECHNIQUE:

  Axial computed tomography images of the chest without intravenous contrast.  

This CT exam was performed using one or more of the following dose reduction 

techniques:  automated exposure control, adjustment of the mA and/or kV 

according to patient size, and/or use of iterative reconstruction technique.

  MIP reconstructed images were created and reviewed.

  Coronal and sagittal reformatted images were created and reviewed.



EXAM DATE/TIME:

  3/20/2017 5:14 PM



COMPARISON:

  There are no prior studies for comparison.



FINDINGS:

  Artifacts:  Motion artifact degrades image quality.

  Lungs and pleural spaces:  Trachea and main bronchi are patent.  There are 

apical blebs bilaterally.  There is mild pleural thickening.  There is a small 

right pleural effusion.  There is patchy airspace disease at the right base.  

There is dependent atelectasis in both lower lobes.

  

  Heart and vasculature:  The heart is mildly enlarged.There is trace fluid in 

pericardial recesses.There are vascular calcifications. Aorta and main 

pulmonary artery are normal in caliber.

  Mediastinum:  There are multiple mildly enlarged middle mediastinal nodes. 

Vidhi are not optimally evaluated without contrast material.  The esophagus is 

incompletely distended which limits evaluation.  There may be proximal and mid 

esophageal wall thickening.  There is a small hiatal hernia.  There are mildly 

enlarged posterior mediastinal nodes adjacent to the distal esophagus.

  Thyroid:  Visualized portion of the thyroid is unremarkable.

  Bones/joints:  There are degenerative changes in the osseus structures.

  Soft tissues:  unremarkable

  

  Upper abdomen:  There is a left renal cyst.  There is minimal 

inflammation/edema in the lori hepatis.



IMPRESSION:  Small right pleural effusion with patchy airspace disease in the 

right costophrenic sulcus; shotty adenopathy; mild cardiomegaly and 

atherosclerotic disease; possible proximal esophageal wall thickening



Additional findings as described above.

## 2017-03-21 LAB
ADD MANUAL DIFF?: NO
ALBUMIN/GLOB SERPL: 1.2 {RATIO} (ref 1.1–1.8)
ALP SERPL-CCNC: 63 U/L (ref 38–133)
ALT SERPL-CCNC: 117 U/L (ref 7–56)
APTT BLD: 24 SECONDS (ref 23.7–30.8)
AST SERPL-CCNC: 141 U/L (ref 15–59)
BASE EXCESS BLDV CALC-SCNC: 0.9 MMOL/L (ref 0–2)
BASE EXCESS BLDV CALC-SCNC: 1.2 MMOL/L (ref 0–2)
BASOPHILS # BLD AUTO: 0.01 K/MM3 (ref 0–2)
BASOPHILS NFR BLD: 0.1 % (ref 0–3)
BILIRUB SERPL-MCNC: 0.4 MG/DL (ref 0.2–1.3)
BUN SERPL-MCNC: 20 MG/DL (ref 7–21)
CALCIUM SERPL-MCNC: 8.3 MG/DL (ref 8.4–10.5)
CHLORIDE SERPL-SCNC: 105 MMOL/L (ref 98–107)
CO2 SERPL-SCNC: 25 MMOL/L (ref 21–33)
EOSINOPHIL # BLD: 0.1 10*3/UL (ref 0–0.7)
EOSINOPHIL NFR BLD: 0.4 % (ref 1.5–5)
ERYTHROCYTE [DISTWIDTH] IN BLOOD BY AUTOMATED COUNT: 18.7 % (ref 11.5–14.5)
GLOBULIN SER-MCNC: 2.8 GM/DL
GLUCOSE SERPL-MCNC: 97 MG/DL (ref 70–110)
GRANULOCYTES # BLD: 7.76 10*3/UL (ref 1.4–6.5)
GRANULOCYTES NFR BLD: 69.5 % (ref 50–68)
HCT VFR BLD CALC: 27 % (ref 42–52)
INR PPP: 1.08 (ref 0.93–1.08)
LYMPHOCYTES # BLD: 2.6 10*3/UL (ref 1.2–3.4)
LYMPHOCYTES NFR BLD AUTO: 23.4 % (ref 22–35)
MAGNESIUM SERPL-MCNC: 1.8 MG/DL (ref 1.7–2.2)
MCH RBC QN AUTO: 20.1 PG (ref 25–35)
MCHC RBC AUTO-ENTMCNC: 29.6 G/DL (ref 31–37)
MCV RBC AUTO: 67.8 FL (ref 80–105)
MONOCYTES # BLD AUTO: 0.7 10*3/UL (ref 0.1–0.6)
MONOCYTES NFR BLD: 6.6 % (ref 1–6)
PH BLDV: 7.31 [PH] (ref 7.32–7.43)
PH BLDV: 7.34 [PH] (ref 7.32–7.43)
PHOSPHATE SERPL-MCNC: 4.5 MG/DL (ref 2.5–4.5)
PLATELET # BLD: 388 10^3/UL (ref 120–450)
PMV BLD AUTO: 9.2 FL (ref 7–11)
POTASSIUM SERPL-SCNC: 3.8 MMOL/L (ref 3.6–5)
PROT SERPL-MCNC: 6.2 G/DL (ref 5.8–8.3)
SODIUM SERPL-SCNC: 142 MMOL/L (ref 132–148)
WBC # BLD AUTO: 11.2 10^3/UL (ref 4.5–11)

## 2017-03-21 RX ADMIN — INSULIN HUMAN SCH: 100 INJECTION, SOLUTION PARENTERAL at 07:30

## 2017-03-21 RX ADMIN — INSULIN HUMAN SCH: 100 INJECTION, SOLUTION PARENTERAL at 11:30

## 2017-03-21 RX ADMIN — INSULIN HUMAN SCH: 100 INJECTION, SOLUTION PARENTERAL at 22:33

## 2017-03-21 RX ADMIN — INSULIN HUMAN SCH: 100 INJECTION, SOLUTION PARENTERAL at 19:37

## 2017-03-21 RX ADMIN — PANTOPRAZOLE SODIUM SCH MG: 40 TABLET, DELAYED RELEASE ORAL at 10:56

## 2017-03-21 RX ADMIN — PIPERACILLIN AND TAZOBACTAM SCH MLS/HR: 3; .375 INJECTION, POWDER, LYOPHILIZED, FOR SOLUTION INTRAVENOUS; PARENTERAL at 06:00

## 2017-03-21 RX ADMIN — PIPERACILLIN AND TAZOBACTAM SCH MLS/HR: 3; .375 INJECTION, POWDER, LYOPHILIZED, FOR SOLUTION INTRAVENOUS; PARENTERAL at 00:25

## 2017-03-21 RX ADMIN — VANCOMYCIN HYDROCHLORIDE SCH MLS/HR: 1 INJECTION, POWDER, LYOPHILIZED, FOR SOLUTION INTRAVENOUS at 10:57

## 2017-03-21 NOTE — CARD
--------------- APPROVED REPORT --------------





EXAM: Two-dimensional and M-mode echocardiogram with Doppler and 

color Doppler.



INDICATION

BRADYCARDIA



2D DIMENSIONS 

Left Atrium (2D)4.1   (1.6-4.0cm)IVSd1.4   (0.7-1.1cm)

LVDd4.8   (3.9-5.9cm)PWd1.2   (0.7-1.1cm)

LVDs2.6   (2.5-4.0cm)FS (%) 45.1   %

LVEF (%)76.4   (>50%)



M-Mode DIMENSIONS 

Aortic Root2.60   (2.2-3.7cm)Aortic Cusp Exc.1.60   (1.5-2.0cm)



Aortic Valve

AoV Peak Hujyfiad286.0cm/[place] Peak GR.12mmHg



Mitral Valve

MV E Xbcrnxad35.3cm/sMV A Egkawcln244.0cm/sE/A ratio0.9



TDI

E/Lateral E'0.0E/Medial E'0.0



Tricuspid Valve

TR Peak [id number]/sRAP [id number] Peak Gr.33mmHg

[id number]



 LEFT VENTRICLE 

The left ventricle is normal size. There is mild concentric left 

ventricular hypertrophy. The left ventricular function is normal. The 

left ventricular ejection fraction is within the normal range. There 

is normal LV segmental wall motion. Transmitral Doppler flow pattern 

is Grade I-abnormal relaxation pattern.



 RIGHT VENTRICLE 

The right ventricle is normal size. There is normal right ventricular 

wall thickness. The right ventricular systolic function is normal.



 ATRIA 

The left atrium size is normal. The right atrium size is normal.



 AORTIC VALVE 

The aortic valve is mildly thickened.



 MITRAL VALVE 

The mitral valve is mildly thickened.



 TRICUSPID VALVE 

There is mild pulmonary hypertension.



 GREAT VESSELS 

The aortic root is normal in size.



 PERICARDIAL EFFUSION 

There is a trace loculated anterior pericardial effusion.



<Conclusion>

The left ventricle is normal size.

There is mild concentric left ventricular hypertrophy.

The left ventricular function is normal.

The left ventricular ejection fraction is within the normal range.

There is normal LV segmental wall motion.

Transmitral Doppler flow pattern is Grade I-abnormal relaxation 

pattern.

There is mild pulmonary hypertension.

## 2017-03-21 NOTE — CARD
--------------- APPROVED REPORT --------------





EKG Measurement

Heart [id number]

[id number]

[id number]

[id number]



<Conclusion>

Junctional rhytnm with JPCs

Right bundle branch block

Abnormal ECG

## 2017-03-21 NOTE — CP.CCUPN
<Lc Sanchez - Last Filed: 03/21/17 12:28>





CCU Subjective





- Physician Review


Subjective (Free Text): 





03/21/17 12:24


Pt s &e. Denies F/C/N/V/D/CP/SOB/dizziness/weakness. Pt has no complaints. 

Comfortably resting. 





CCU Objective





- Vital Signs / Intake & Output


Intake and Output (Last 8hrs): 


 Intake & Output











 03/20/17 03/21/17 03/21/17





 22:59 06:59 14:59


 


Intake Total  2620 


 


Output Total  1500 


 


Balance  1120 


 


Weight 175 lb  176 lb


 


Intake:   


 


  IV  2500 


 


    Right Hand  2500 


 


  Oral  120 


 


Output:   


 


  Urine  1500 


 


    Urine, Voided  1500 


 


Other:   


 


  Voiding Method Urinal  


 


  # Bowel Movements  0 














- Physical Exam


Head: Positive for: Atraumatic, Normocephalic


Pupils: Positive for: PERRL


Extroacular Muscles: Positive for: EOMI


Conjunctiva: Positive for: Normal


Mouth: Positive for: Moist Mucous Membranes


Neck: Positive for: Normal Range of Motion.  Negative for: MIDLINE TENDERNESS, 

Paraspinal Tenderness


Respiratory/Chest: Positive for: Clear to Auscultation, Good Air Exchange.  

Negative for: Respiratory Distress, Accessory Muscle Use


Cardiovascular: Positive for: Normal S1, S2.  Negative for: Murmurs


Abdomen: Positive for: Normal Bowel Sounds.  Negative for: Tenderness, 

Distention, Peritoneal Signs, Rebound, Guarding


Back: Positive for: Normal Inspection


Upper Extremity: Positive for: Normal Inspection.  Negative for: Cyanosis, Edema


Lower Extremity: Positive for: Normal Inspection.  Negative for: Edema


Neurological: Positive for: GCS=15, CN II-XII Intact, Speech Normal, Other (No 

focal neurological deficits)


Skin: Positive for: Warm, Dry, Normal Color.  Negative for: Rashes


Psychiatric: Positive for: Alert, Oriented x 3, Normal Insight, Normal 

Concentration.  Negative for: Anxious, Agitated





- Medications


Active Medications: 


Active Medications











Generic Name Dose Route Start Last Admin





  Trade Name Freq  PRN Reason Stop Dose Admin


 


Acetaminophen  650 mg 03/20/17 16:10  





  Tylenol 325mg Tab  PO   





  Q6H PRN   





  Pain, Mild (1-3)   


 


Albuterol Sulfate  10 mg 03/21/17 22:48  





  Albuterol 0.5% Inhal Sol (2.5 Mg/0.5 Ml) Ud  IH 03/21/17 22:49  





  ONCE ONE   


 


Aspirin  81 mg 03/21/17 10:00  





  Ecotrin  PO   





  DAILY [facility]   


 


Vancomycin HCl  250 mls @ 167 mls/hr 03/21/17 10:00 03/21/17 10:57





  Vancomycin 1gm  IVPB   167 mls/hr





  DAILY [facility]   Administration





  Protocol   


 


Insulin Human Regular  0 units 03/20/17 16:30 03/20/17 22:00





  Humulin R Med  SC   Not Given





  [facility]   





  Protocol   


 


Ondansetron HCl  4 mg 03/20/17 16:10  





  Zofran Inj  IVP   





  Q6H PRN   





  Nausea/Vomiting   


 


Pantoprazole Sodium  40 mg 03/21/17 10:00 03/21/17 10:56





  Protonix Ec Tab  PO   40 mg





  DAILY [facility]   Administration














- Patient Studies


Lab Studies: 


 Lab Studies











  03/21/17 03/21/17 03/21/17 Range/Units





  08:45 06:20 01:10 


 


WBC   11.2 H D   (4.5-11.0)  10^3/ul


 


RBC   3.98   (3.5-6.1)  10^6/uL


 


Hgb   8.0 L   (14.0-18.0)  gm/dL


 


Hct   27.0 L   (42.0-52.0)  %


 


MCV   67.8 L   (80.0-105.0)  fL


 


MCH   20.1 L   (25.0-35.0)  pg


 


MCHC   29.6 L   (31.0-37.0)  g/dl


 


RDW   18.7 H   (11.5-14.5)  %


 


Plt Count   388   (120.0-450.0)  10^3/uL


 


MPV   9.2   (7.0-11.0)  fl


 


Gran %   69.5 H   (50.0-68.0)  %


 


Lymph % (Auto)   23.4   (22.0-35.0)  %


 


Mono % (Auto)   6.6 H   (1.0-6.0)  %


 


Eos % (Auto)   0.4 L   (1.5-5.0)  %


 


Baso % (Auto)   0.1   (0.0-3.0)  %


 


Gran #   7.76 H   (1.4-6.5)  


 


Lymph #   2.6   (1.2-3.4)  


 


Mono #   0.7 H   (0.1-0.6)  


 


Eos #   0.1   (0.0-0.7)  


 


Baso #   0.01   (0.0-2.0)  K/mm3


 


PT   11.7   (9.9-11.8)  Seconds


 


INR   1.08   (0.93-1.08)  


 


APTT   24.0   (23.7-30.8)  Seconds


 


pO2  36   44  (30-55)  mm/Hg


 


VBG pH  7.31 L   7.34  (7.32-7.43)  


 


VBG pCO2  57.0   51.0  (40-60)  


 


VBG HCO3  28.7 H   27.5  (21-28)  mmol/l


 


VBG Total CO2  30.4 H   29.1 H  (22-28)  mmol.L


 


VBG O2 Sat (Calc)  56.4   75.6 H  (40-65)  %


 


VBG Base Excess  1.2   0.9  (0.0-2.0)  mmol/L


 


VBG Potassium  4.2   4.2  (3.6-5.2)  mmol/L


 


Sodium  141.0  142  141.0  (132-148)  mmol/L


 


Chloride  109.0 H  105  113.0 H  ()  mmol/L


 


Glucose  198 H   82  ()  mg/dl


 


Lactate  3.1 H   2.3 H  (0.7-2.1)  mmol/L


 


FiO2  21.0   21.0  %


 


Potassium   3.8   (3.6-5.0)  mmol/L


 


Carbon Dioxide   25   (21-33)  mmol/L


 


Anion Gap   16   (10-20)  


 


BUN   20   (7-21)  mg/dL


 


Creatinine   0.9   (0.5-1.4)  mg/dL


 


Est GFR (African Amer)   > 60   


 


Est GFR (Non-Af Amer)   > 60   


 


POC Glucose (mg/dL)     ()  mg/dL


 


Random Glucose   97   ()  mg/dL


 


Calcium   8.3 L   (8.4-10.5)  mg/dL


 


Phosphorus   4.5   (2.5-4.5)  mg/dL


 


Magnesium   1.8   (1.7-2.2)  mg/dL


 


Total Bilirubin   0.4   (0.2-1.3)  mg/dL


 


AST   141 H   (15-59)  U/L


 


ALT   117 H   (7-56)  U/L


 


Alkaline Phosphatase   63   ()  U/L


 


Troponin I   0.02  D   ng/mL


 


Total Protein   6.2   (5.8-8.3)  g/dL


 


Albumin   3.4   (3.0-4.8)  g/dL


 


Globulin   2.8   gm/dL


 


Albumin/Globulin Ratio   1.2   (1.1-1.8)  


 


Venous Blood Potassium  4.2   4.2  (3.6-5.2)  mmol/L


 


Urine Color     (YELLOW)  


 


Urine Appearance     (CLEAR)  


 


Urine pH     (4.7-8.0)  


 


Ur Specific Gravity     (1.005-1.035)  


 


Urine Protein     (<30 mg/dL)  mg/dL


 


Urine Glucose (UA)     (NEGATIVE)  mg/dL


 


Urine Ketones     (NEGATIVE)  mg/dL


 


Urine Blood     (NEGATIVE)  


 


Urine Nitrate     (NEGATIVE)  


 


Urine Bilirubin     (NEGATIVE)  


 


Urine Urobilinogen     (<1 E.U./dL)  E.U./dL


 


Ur Leukocyte Esterase     (NEGATIVE)  Yovana/uL


 


Urine RBC     (0-2)  /hpf


 


Urine WBC     (0-6)  /hpf


 


Ur Epithelial Cells     (0-5)  /hpf


 


Urine Bacteria     (NEG)  


 


Ur Random Sodium     meq/L


 


Ur Random Potassium     meq/L


 


Urine Opiates Screen     (NEGATIVE)  


 


Urine Methadone Screen     (NEGATIVE)  


 


Ur Barbiturates Screen     (NEGATIVE)  


 


Ur Phencyclidine Scrn     (NEGATIVE)  


 


Ur Amphetamines Screen     (NEGATIVE)  


 


U Benzodiazepines Scrn     (NEGATIVE)  


 


U Oth Cocaine Metabols     (NEGATIVE)  


 


U Cannabinoids Screen     (NEGATIVE)  














  03/20/17 03/20/17 03/20/17 Range/Units





  23:15 22:03 21:05 


 


WBC     (4.5-11.0)  10^3/ul


 


RBC     (3.5-6.1)  10^6/uL


 


Hgb     (14.0-18.0)  gm/dL


 


Hct     (42.0-52.0)  %


 


MCV     (80.0-105.0)  fL


 


MCH     (25.0-35.0)  pg


 


MCHC     (31.0-37.0)  g/dl


 


RDW     (11.5-14.5)  %


 


Plt Count     (120.0-450.0)  10^3/uL


 


MPV     (7.0-11.0)  fl


 


Gran %     (50.0-68.0)  %


 


Lymph % (Auto)     (22.0-35.0)  %


 


Mono % (Auto)     (1.0-6.0)  %


 


Eos % (Auto)     (1.5-5.0)  %


 


Baso % (Auto)     (0.0-3.0)  %


 


Gran #     (1.4-6.5)  


 


Lymph #     (1.2-3.4)  


 


Mono #     (0.1-0.6)  


 


Eos #     (0.0-0.7)  


 


Baso #     (0.0-2.0)  K/mm3


 


PT     (9.9-11.8)  Seconds


 


INR     (0.93-1.08)  


 


APTT     (23.7-30.8)  Seconds


 


pO2    31  (30-55)  mm/Hg


 


VBG pH    7.21 L  (7.32-7.43)  


 


VBG pCO2    65.0 H  (40-60)  


 


VBG HCO3    26.0  (21-28)  mmol/l


 


VBG Total CO2    28.0  (22-28)  mmol.L


 


VBG O2 Sat (Calc)    50.1  (40-65)  %


 


VBG Base Excess    -3.2 L  (0.0-2.0)  mmol/L


 


VBG Potassium    6.2 H*  (3.6-5.2)  mmol/L


 


Sodium    141.0  (132-148)  mmol/L


 


Chloride    109.0 H  ()  mmol/L


 


Glucose    124 H  ()  mg/dl


 


Lactate    4.2 H*  (0.7-2.1)  mmol/L


 


FiO2    21.0  %


 


Potassium    6.3 H* D  (3.6-5.0)  mmol/L


 


Carbon Dioxide    24  (21-33)  mmol/L


 


Anion Gap    18  (10-20)  


 


BUN    27 H  (7-21)  mg/dL


 


Creatinine    1.1  (0.5-1.4)  mg/dL


 


Est GFR (African Amer)    > 60  


 


Est GFR (Non-Af Amer)    > 60  


 


POC Glucose (mg/dL)   191 H   ()  mg/dL


 


Random Glucose    123 H  ()  mg/dL


 


Calcium    8.6  (8.4-10.5)  mg/dL


 


Phosphorus     (2.5-4.5)  mg/dL


 


Magnesium    1.9  (1.7-2.2)  mg/dL


 


Total Bilirubin     (0.2-1.3)  mg/dL


 


AST     (15-59)  U/L


 


ALT     (7-56)  U/L


 


Alkaline Phosphatase     ()  U/L


 


Troponin I    < 0.01  ng/mL


 


Total Protein     (5.8-8.3)  g/dL


 


Albumin     (3.0-4.8)  g/dL


 


Globulin     gm/dL


 


Albumin/Globulin Ratio     (1.1-1.8)  


 


Venous Blood Potassium    6.2 H*  (3.6-5.2)  mmol/L


 


Urine Color  Yellow    (YELLOW)  


 


Urine Appearance  Sl cloudy    (CLEAR)  


 


Urine pH  5.5    (4.7-8.0)  


 


Ur Specific Gravity  >= 1.030    (1.005-1.035)  


 


Urine Protein  30 H    (<30 mg/dL)  mg/dL


 


Urine Glucose (UA)  Negative    (NEGATIVE)  mg/dL


 


Urine Ketones  Trace H    (NEGATIVE)  mg/dL


 


Urine Blood  Negative    (NEGATIVE)  


 


Urine Nitrate  Negative    (NEGATIVE)  


 


Urine Bilirubin  Negative    (NEGATIVE)  


 


Urine Urobilinogen  0.2    (<1 E.U./dL)  E.U./dL


 


Ur Leukocyte Esterase  Negative    (NEGATIVE)  Yovana/uL


 


Urine RBC  0 - 2    (0-2)  /hpf


 


Urine WBC  1 - 3    (0-6)  /hpf


 


Ur Epithelial Cells  0 - 2    (0-5)  /hpf


 


Urine Bacteria  Rare    (NEG)  


 


Ur Random Sodium  51    meq/L


 


Ur Random Potassium  92.4    meq/L


 


Urine Opiates Screen  Negative    (NEGATIVE)  


 


Urine Methadone Screen  Negative    (NEGATIVE)  


 


Ur Barbiturates Screen  Negative    (NEGATIVE)  


 


Ur Phencyclidine Scrn  Negative    (NEGATIVE)  


 


Ur Amphetamines Screen  Negative    (NEGATIVE)  


 


U Benzodiazepines Scrn  Negative    (NEGATIVE)  


 


U Oth Cocaine Metabols  Negative    (NEGATIVE)  


 


U Cannabinoids Screen  Negative    (NEGATIVE)  








 Laboratory Results - last 24 hr











  03/20/17 03/20/17 03/20/17





  21:05 22:03 23:15


 


WBC   


 


RBC   


 


Hgb   


 


Hct   


 


MCV   


 


MCH   


 


MCHC   


 


RDW   


 


Plt Count   


 


MPV   


 


Gran %   


 


Lymph % (Auto)   


 


Mono % (Auto)   


 


Eos % (Auto)   


 


Baso % (Auto)   


 


Gran #   


 


Lymph #   


 


Mono #   


 


Eos #   


 


Baso #   


 


PT   


 


INR   


 


APTT   


 


pO2  31  


 


VBG pH  7.21 L  


 


VBG pCO2  65.0 H  


 


VBG HCO3  26.0  


 


VBG Total CO2  28.0  


 


VBG O2 Sat (Calc)  50.1  


 


VBG Base Excess  -3.2 L  


 


VBG Potassium  6.2 H*  


 


Sodium  140  


 


Chloride  104  


 


Glucose  124 H  


 


Lactate  4.2 H*  


 


FiO2  21.0  


 


Potassium  6.3 H* D  


 


Carbon Dioxide  24  


 


Anion Gap  18  


 


BUN  27 H  


 


Creatinine  1.1  


 


Est GFR ( Amer)  > 60  


 


Est GFR (Non-Af Amer)  > 60  


 


POC Glucose (mg/dL)   191 H 


 


Random Glucose  123 H  


 


Calcium  8.6  


 


Phosphorus   


 


Magnesium  1.9  


 


Total Bilirubin   


 


AST   


 


ALT   


 


Alkaline Phosphatase   


 


Troponin I  < 0.01  


 


Total Protein   


 


Albumin   


 


Globulin   


 


Albumin/Globulin Ratio   


 


Venous Blood Potassium  6.2 H*  


 


Urine Color    Yellow


 


Urine Appearance    Sl cloudy


 


Urine pH    5.5


 


Ur Specific Gravity    >= 1.030


 


Urine Protein    30 H


 


Urine Glucose (UA)    Negative


 


Urine Ketones    Trace H


 


Urine Blood    Negative


 


Urine Nitrate    Negative


 


Urine Bilirubin    Negative


 


Urine Urobilinogen    0.2


 


Ur Leukocyte Esterase    Negative


 


Urine RBC    0 - 2


 


Urine WBC    1 - 3


 


Ur Epithelial Cells    0 - 2


 


Urine Bacteria    Rare


 


Ur Random Sodium    51


 


Ur Random Potassium    92.4


 


Urine Opiates Screen    Negative


 


Urine Methadone Screen    Negative


 


Ur Barbiturates Screen    Negative


 


Ur Phencyclidine Scrn    Negative


 


Ur Amphetamines Screen    Negative


 


U Benzodiazepines Scrn    Negative


 


U Oth Cocaine Metabols    Negative


 


U Cannabinoids Screen    Negative














  03/21/17 03/21/17 03/21/17





  01:10 06:20 08:45


 


WBC   11.2 H D 


 


RBC   3.98 


 


Hgb   8.0 L 


 


Hct   27.0 L 


 


MCV   67.8 L 


 


MCH   20.1 L 


 


MCHC   29.6 L 


 


RDW   18.7 H 


 


Plt Count   388 


 


MPV   9.2 


 


Gran %   69.5 H 


 


Lymph % (Auto)   23.4 


 


Mono % (Auto)   6.6 H 


 


Eos % (Auto)   0.4 L 


 


Baso % (Auto)   0.1 


 


Gran #   7.76 H 


 


Lymph #   2.6 


 


Mono #   0.7 H 


 


Eos #   0.1 


 


Baso #   0.01 


 


PT   11.7 


 


INR   1.08 


 


APTT   24.0 


 


pO2  44   36


 


VBG pH  7.34   7.31 L


 


VBG pCO2  51.0   57.0


 


VBG HCO3  27.5   28.7 H


 


VBG Total CO2  29.1 H   30.4 H


 


VBG O2 Sat (Calc)  75.6 H   56.4


 


VBG Base Excess  0.9   1.2


 


VBG Potassium  4.2   4.2


 


Sodium  141.0  142  141.0


 


Chloride  113.0 H  105  109.0 H


 


Glucose  82   198 H


 


Lactate  2.3 H   3.1 H


 


FiO2  21.0   21.0


 


Potassium   3.8 


 


Carbon Dioxide   25 


 


Anion Gap   16 


 


BUN   20 


 


Creatinine   0.9 


 


Est GFR ( Amer)   > 60 


 


Est GFR (Non-Af Amer)   > 60 


 


POC Glucose (mg/dL)   


 


Random Glucose   97 


 


Calcium   8.3 L 


 


Phosphorus   4.5 


 


Magnesium   1.8 


 


Total Bilirubin   0.4 


 


AST   141 H 


 


ALT   117 H 


 


Alkaline Phosphatase   63 


 


Troponin I   0.02  D 


 


Total Protein   6.2 


 


Albumin   3.4 


 


Globulin   2.8 


 


Albumin/Globulin Ratio   1.2 


 


Venous Blood Potassium  4.2   4.2


 


Urine Color   


 


Urine Appearance   


 


Urine pH   


 


Ur Specific Gravity   


 


Urine Protein   


 


Urine Glucose (UA)   


 


Urine Ketones   


 


Urine Blood   


 


Urine Nitrate   


 


Urine Bilirubin   


 


Urine Urobilinogen   


 


Ur Leukocyte Esterase   


 


Urine RBC   


 


Urine WBC   


 


Ur Epithelial Cells   


 


Urine Bacteria   


 


Ur Random Sodium   


 


Ur Random Potassium   


 


Urine Opiates Screen   


 


Urine Methadone Screen   


 


Ur Barbiturates Screen   


 


Ur Phencyclidine Scrn   


 


Ur Amphetamines Screen   


 


U Benzodiazepines Scrn   


 


U Oth Cocaine Metabols   


 


U Cannabinoids Screen   











EKG/Cardiology Studies: 


Cardiology / EKG Studies





03/20/17 13:32


EKG [ELECTROCARDIOGRAM] Stat 


   Comment: 


   Reason For Exam: CHEST PAIN





03/20/17 15:52


EKG [ELECTROCARDIOGRAM] Stat 


   Comment: 


   Reason For Exam: CHEST PAIN





03/20/17 21:16


EKG [ELECTROCARDIOGRAM] Stat 


   Comment: 


   Reason For Exam: CHEST PAIN





03/21/17


EKG [ELECTROCARDIOGRAM] Urgent 


   Comment: 


   Reason For Exam: Eliel


EKG [ELECTROCARDIOGRAM] Urgent 


   Comment: 


   Reason For Exam: bradycardia











Fingerstick Blood Sugar Results: 191





Review of Systems





- Constitutional


Constitutional: absent: Fever, Chills, Sweats, Weakness





- EENT


Eyes: UNREMARKABLE.  absent: Blurred Vision, Change in Vision


Ears: UNREMARKABLE.  absent: Decreased Hearing, Ear Discharge


Nose/Mouth/Throat: UNREMARKABLE





- Cardiovascular


Cardiovascular: UNREMARKABLE.  absent: Chest Pain, Chest Pain at Rest, Chest 

Pain with Activity, Diaphoresis, Dyspnea





- Respiratory


Respiratory: UNREMARKABLE.  absent: Cough, Dyspnea, Hemoptysis, Dyspnea on 

Exertion, Wheezing, Chest Congestion





- Gastrointestinal


Gastrointestinal: absent: Abdominal Pain, Belching, Bloating, Change in Bowel 

Habits





Critical Care Progress Note





- Nutrition


Nutrition: 


 Nutrition











 Category Date Time Status


 


 Heart Healthy Diet [DIET] Diets  03/21/17 Breakfast Ordered














Assessment/Plan





- Assessment and Plan (Free Text)


Assessment: 


60yo M with PMHx of CAD, HTN, DM, Asthma here for evaluation of Bradycardia, 

syncope


Eliel cardia : resolved


syncopy; Atypical Chest pain: resolved 





Neuro: AAO x3, Syncopy 2/2 Bradycardia: resolved 


-Neurocheck 


-Tylenol for pain 





CV: Bradycardia possible 2/2 beta blocker toxicity , normotensive, eliel cardia 

resolved. 


h/o HTN


EKG - sinus Eliel @ 40; Possible 3rd degree heart block present. Few missed p-

waves. No ST changes


-Cardiology following: Dr. Cortés rec: Atropin/Dopamin PRN , Possible cath 

tomorrow. 


-Hold beta blocker :Metoprolol


-f/u Echo 


-trend trop


-ASA PO


-Trancutaneous pacing 70bpm 


-Monitor VS 





Pulm: Maintain SaO2 90% + 


CXR - no active disease


-NC O2 


-Chest CT w/o contrast : mild pulmonary effusion, cardiomegali 





GI: 


-US reads: fatty liver


-PTX ppx 


-NPO 


-Zofran





Renal : Keep Euvolemia 





Endo : maintain euglycemia . h/o DM


-f/u TSH


-SSI 


-Accucheck


-Hold Metformin





ID: Leukocytosis 18k , Afebrile 


-Vanc/Zosyn


-f/u urine/blood cx


-Tylenol for fever 





DVT: SCD, heparin sc 





Dispo: Transfer to tele 





Case discussed with ICU attending 





<Moo Glover - Last Filed: 03/21/17 18:04>





CCU Objective





- Vital Signs / Intake & Output


Vital Signs (Last 4 hours): 


Vital Signs











  Pulse Resp BP


 


 03/21/17 15:00  83  16  151/77 H











Intake and Output (Last 8hrs): 


 Intake & Output











 03/21/17 03/21/17 03/21/17





 06:59 14:59 22:59


 


Intake Total 2620  


 


Output Total 1500  


 


Balance 1120  


 


Weight  176 lb 


 


Intake:   


 


  IV 2500  


 


    Right Hand 2500  


 


  Oral 120  


 


Output:   


 


  Urine 1500  


 


    Urine, Voided 1500  


 


Other:   


 


  # Bowel Movements 0  














- Medications


Active Medications: 


Active Medications











Generic Name Dose Route Start Last Admin





  Trade Name Freq  PRN Reason Stop Dose Admin


 


Acetaminophen  650 mg 03/20/17 16:10  





  Tylenol 325mg Tab  PO   





  Q6H PRN   





  Pain, Mild (1-3)   


 


Albuterol Sulfate  10 mg 03/21/17 22:48  





  Albuterol 0.5% Inhal Sol (2.5 Mg/0.5 Ml) Ud  IH 03/21/17 22:49  





  ONCE ONE   


 


Aspirin  81 mg 03/21/17 10:00  





  Ecotrin  PO   





  DAILY [facility]   


 


Vancomycin HCl  250 mls @ 167 mls/hr 03/21/17 10:00 03/21/17 10:57





  Vancomycin 1gm  IVPB   167 mls/hr





  DAILY [facility]   Administration





  Protocol   


 


Insulin Human Regular  0 units 03/20/17 16:30 03/20/17 22:00





  Humulin R Med  SC   Not Given





  [facility]   





  Protocol   


 


Ondansetron HCl  4 mg 03/20/17 16:10  





  Zofran Inj  IVP   





  Q6H PRN   





  Nausea/Vomiting   


 


Pantoprazole Sodium  40 mg 03/21/17 10:00 03/21/17 10:56





  Protonix Ec Tab  PO   40 mg





  DAILY [facility]   Administration














- Patient Studies


Lab Studies: 


 Lab Studies











  03/21/17 03/21/17 03/21/17 Range/Units





  16:03 15:00 11:23 


 


WBC     (4.5-11.0)  10^3/ul


 


RBC     (3.5-6.1)  10^6/uL


 


Hgb     (14.0-18.0)  gm/dL


 


Hct     (42.0-52.0)  %


 


MCV     (80.0-105.0)  fL


 


MCH     (25.0-35.0)  pg


 


MCHC     (31.0-37.0)  g/dl


 


RDW     (11.5-14.5)  %


 


Plt Count     (120.0-450.0)  10^3/uL


 


MPV     (7.0-11.0)  fl


 


Gran %     (50.0-68.0)  %


 


Lymph % (Auto)     (22.0-35.0)  %


 


Mono % (Auto)     (1.0-6.0)  %


 


Eos % (Auto)     (1.5-5.0)  %


 


Baso % (Auto)     (0.0-3.0)  %


 


Gran #     (1.4-6.5)  


 


Lymph #     (1.2-3.4)  


 


Mono #     (0.1-0.6)  


 


Eos #     (0.0-0.7)  


 


Baso #     (0.0-2.0)  K/mm3


 


PT     (9.9-11.8)  Seconds


 


INR     (0.93-1.08)  


 


APTT     (23.7-30.8)  Seconds


 


pO2     (30-55)  mm/Hg


 


VBG pH     (7.32-7.43)  


 


VBG pCO2     (40-60)  


 


VBG HCO3     (21-28)  mmol/l


 


VBG Total CO2     (22-28)  mmol.L


 


VBG O2 Sat (Calc)     (40-65)  %


 


VBG Base Excess     (0.0-2.0)  mmol/L


 


VBG Potassium     (3.6-5.2)  mmol/L


 


Sodium     (132-148)  mmol/L


 


Chloride     ()  mmol/L


 


Glucose     ()  mg/dl


 


Lactate     (0.7-2.1)  mmol/L


 


FiO2     %


 


Potassium     (3.6-5.0)  mmol/L


 


Carbon Dioxide     (21-33)  mmol/L


 


Anion Gap     (10-20)  


 


BUN     (7-21)  mg/dL


 


Creatinine     (0.5-1.4)  mg/dL


 


Est GFR (African Amer)     


 


Est GFR (Non-Af Amer)     


 


POC Glucose (mg/dL)  220 H   215 H  ()  mg/dL


 


Random Glucose     ()  mg/dL


 


Calcium     (8.4-10.5)  mg/dL


 


Phosphorus     (2.5-4.5)  mg/dL


 


Magnesium     (1.7-2.2)  mg/dL


 


Total Bilirubin     (0.2-1.3)  mg/dL


 


AST     (15-59)  U/L


 


ALT     (7-56)  U/L


 


Alkaline Phosphatase     ()  U/L


 


Troponin I   < 0.01  D   ng/mL


 


Total Protein     (5.8-8.3)  g/dL


 


Albumin     (3.0-4.8)  g/dL


 


Globulin     gm/dL


 


Albumin/Globulin Ratio     (1.1-1.8)  


 


Procalcitonin     (0.19-0.49)  NG/ML


 


Venous Blood Potassium     (3.6-5.2)  mmol/L


 


Urine Color     (YELLOW)  


 


Urine Appearance     (CLEAR)  


 


Urine pH     (4.7-8.0)  


 


Ur Specific Gravity     (1.005-1.035)  


 


Urine Protein     (<30 mg/dL)  mg/dL


 


Urine Glucose (UA)     (NEGATIVE)  mg/dL


 


Urine Ketones     (NEGATIVE)  mg/dL


 


Urine Blood     (NEGATIVE)  


 


Urine Nitrate     (NEGATIVE)  


 


Urine Bilirubin     (NEGATIVE)  


 


Urine Urobilinogen     (<1 E.U./dL)  E.U./dL


 


Ur Leukocyte Esterase     (NEGATIVE)  Yovana/uL


 


Urine RBC     (0-2)  /hpf


 


Urine WBC     (0-6)  /hpf


 


Ur Epithelial Cells     (0-5)  /hpf


 


Urine Bacteria     (NEG)  


 


Ur Random Sodium     meq/L


 


Ur Random Potassium     meq/L


 


Urine Opiates Screen     (NEGATIVE)  


 


Urine Methadone Screen     (NEGATIVE)  


 


Ur Barbiturates Screen     (NEGATIVE)  


 


Ur Phencyclidine Scrn     (NEGATIVE)  


 


Ur Amphetamines Screen     (NEGATIVE)  


 


U Benzodiazepines Scrn     (NEGATIVE)  


 


U Oth Cocaine Metabols     (NEGATIVE)  


 


U Cannabinoids Screen     (NEGATIVE)  














  03/21/17 03/21/17 03/21/17 Range/Units





  08:45 07:20 06:20 


 


WBC    11.2 H D  (4.5-11.0)  10^3/ul


 


RBC    3.98  (3.5-6.1)  10^6/uL


 


Hgb    8.0 L  (14.0-18.0)  gm/dL


 


Hct    27.0 L  (42.0-52.0)  %


 


MCV    67.8 L  (80.0-105.0)  fL


 


MCH    20.1 L  (25.0-35.0)  pg


 


MCHC    29.6 L  (31.0-37.0)  g/dl


 


RDW    18.7 H  (11.5-14.5)  %


 


Plt Count    388  (120.0-450.0)  10^3/uL


 


MPV    9.2  (7.0-11.0)  fl


 


Gran %    69.5 H  (50.0-68.0)  %


 


Lymph % (Auto)    23.4  (22.0-35.0)  %


 


Mono % (Auto)    6.6 H  (1.0-6.0)  %


 


Eos % (Auto)    0.4 L  (1.5-5.0)  %


 


Baso % (Auto)    0.1  (0.0-3.0)  %


 


Gran #    7.76 H  (1.4-6.5)  


 


Lymph #    2.6  (1.2-3.4)  


 


Mono #    0.7 H  (0.1-0.6)  


 


Eos #    0.1  (0.0-0.7)  


 


Baso #    0.01  (0.0-2.0)  K/mm3


 


PT    11.7  (9.9-11.8)  Seconds


 


INR    1.08  (0.93-1.08)  


 


APTT    24.0  (23.7-30.8)  Seconds


 


pO2  36    (30-55)  mm/Hg


 


VBG pH  7.31 L    (7.32-7.43)  


 


VBG pCO2  57.0    (40-60)  


 


VBG HCO3  28.7 H    (21-28)  mmol/l


 


VBG Total CO2  30.4 H    (22-28)  mmol.L


 


VBG O2 Sat (Calc)  56.4    (40-65)  %


 


VBG Base Excess  1.2    (0.0-2.0)  mmol/L


 


VBG Potassium  4.2    (3.6-5.2)  mmol/L


 


Sodium  141.0   142  (132-148)  mmol/L


 


Chloride  109.0 H   105  ()  mmol/L


 


Glucose  198 H    ()  mg/dl


 


Lactate  3.1 H    (0.7-2.1)  mmol/L


 


FiO2  21.0    %


 


Potassium    3.8  (3.6-5.0)  mmol/L


 


Carbon Dioxide    25  (21-33)  mmol/L


 


Anion Gap    16  (10-20)  


 


BUN    20  (7-21)  mg/dL


 


Creatinine    0.9  (0.5-1.4)  mg/dL


 


Est GFR (African Amer)    > 60  


 


Est GFR (Non-Af Amer)    > 60  


 


POC Glucose (mg/dL)   128 H   ()  mg/dL


 


Random Glucose    97  ()  mg/dL


 


Calcium    8.3 L  (8.4-10.5)  mg/dL


 


Phosphorus    4.5  (2.5-4.5)  mg/dL


 


Magnesium    1.8  (1.7-2.2)  mg/dL


 


Total Bilirubin    0.4  (0.2-1.3)  mg/dL


 


AST    141 H  (15-59)  U/L


 


ALT    117 H  (7-56)  U/L


 


Alkaline Phosphatase    63  ()  U/L


 


Troponin I    0.02  D  ng/mL


 


Total Protein    6.2  (5.8-8.3)  g/dL


 


Albumin    3.4  (3.0-4.8)  g/dL


 


Globulin    2.8  gm/dL


 


Albumin/Globulin Ratio    1.2  (1.1-1.8)  


 


Procalcitonin     (0.19-0.49)  NG/ML


 


Venous Blood Potassium  4.2    (3.6-5.2)  mmol/L


 


Urine Color     (YELLOW)  


 


Urine Appearance     (CLEAR)  


 


Urine pH     (4.7-8.0)  


 


Ur Specific Gravity     (1.005-1.035)  


 


Urine Protein     (<30 mg/dL)  mg/dL


 


Urine Glucose (UA)     (NEGATIVE)  mg/dL


 


Urine Ketones     (NEGATIVE)  mg/dL


 


Urine Blood     (NEGATIVE)  


 


Urine Nitrate     (NEGATIVE)  


 


Urine Bilirubin     (NEGATIVE)  


 


Urine Urobilinogen     (<1 E.U./dL)  E.U./dL


 


Ur Leukocyte Esterase     (NEGATIVE)  Yovana/uL


 


Urine RBC     (0-2)  /hpf


 


Urine WBC     (0-6)  /hpf


 


Ur Epithelial Cells     (0-5)  /hpf


 


Urine Bacteria     (NEG)  


 


Ur Random Sodium     meq/L


 


Ur Random Potassium     meq/L


 


Urine Opiates Screen     (NEGATIVE)  


 


Urine Methadone Screen     (NEGATIVE)  


 


Ur Barbiturates Screen     (NEGATIVE)  


 


Ur Phencyclidine Scrn     (NEGATIVE)  


 


Ur Amphetamines Screen     (NEGATIVE)  


 


U Benzodiazepines Scrn     (NEGATIVE)  


 


U Oth Cocaine Metabols     (NEGATIVE)  


 


U Cannabinoids Screen     (NEGATIVE)  














  03/21/17 03/20/17 03/20/17 Range/Units





  01:10 23:15 22:03 


 


WBC     (4.5-11.0)  10^3/ul


 


RBC     (3.5-6.1)  10^6/uL


 


Hgb     (14.0-18.0)  gm/dL


 


Hct     (42.0-52.0)  %


 


MCV     (80.0-105.0)  fL


 


MCH     (25.0-35.0)  pg


 


MCHC     (31.0-37.0)  g/dl


 


RDW     (11.5-14.5)  %


 


Plt Count     (120.0-450.0)  10^3/uL


 


MPV     (7.0-11.0)  fl


 


Gran %     (50.0-68.0)  %


 


Lymph % (Auto)     (22.0-35.0)  %


 


Mono % (Auto)     (1.0-6.0)  %


 


Eos % (Auto)     (1.5-5.0)  %


 


Baso % (Auto)     (0.0-3.0)  %


 


Gran #     (1.4-6.5)  


 


Lymph #     (1.2-3.4)  


 


Mono #     (0.1-0.6)  


 


Eos #     (0.0-0.7)  


 


Baso #     (0.0-2.0)  K/mm3


 


PT     (9.9-11.8)  Seconds


 


INR     (0.93-1.08)  


 


APTT     (23.7-30.8)  Seconds


 


pO2  44    (30-55)  mm/Hg


 


VBG pH  7.34    (7.32-7.43)  


 


VBG pCO2  51.0    (40-60)  


 


VBG HCO3  27.5    (21-28)  mmol/l


 


VBG Total CO2  29.1 H    (22-28)  mmol.L


 


VBG O2 Sat (Calc)  75.6 H    (40-65)  %


 


VBG Base Excess  0.9    (0.0-2.0)  mmol/L


 


VBG Potassium  4.2    (3.6-5.2)  mmol/L


 


Sodium  141.0    (132-148)  mmol/L


 


Chloride  113.0 H    ()  mmol/L


 


Glucose  82    ()  mg/dl


 


Lactate  2.3 H    (0.7-2.1)  mmol/L


 


FiO2  21.0    %


 


Potassium     (3.6-5.0)  mmol/L


 


Carbon Dioxide     (21-33)  mmol/L


 


Anion Gap     (10-20)  


 


BUN     (7-21)  mg/dL


 


Creatinine     (0.5-1.4)  mg/dL


 


Est GFR (African Amer)     


 


Est GFR (Non-Af Amer)     


 


POC Glucose (mg/dL)    191 H  ()  mg/dL


 


Random Glucose     ()  mg/dL


 


Calcium     (8.4-10.5)  mg/dL


 


Phosphorus     (2.5-4.5)  mg/dL


 


Magnesium     (1.7-2.2)  mg/dL


 


Total Bilirubin     (0.2-1.3)  mg/dL


 


AST     (15-59)  U/L


 


ALT     (7-56)  U/L


 


Alkaline Phosphatase     ()  U/L


 


Troponin I     ng/mL


 


Total Protein     (5.8-8.3)  g/dL


 


Albumin     (3.0-4.8)  g/dL


 


Globulin     gm/dL


 


Albumin/Globulin Ratio     (1.1-1.8)  


 


Procalcitonin     (0.19-0.49)  NG/ML


 


Venous Blood Potassium  4.2    (3.6-5.2)  mmol/L


 


Urine Color   Yellow   (YELLOW)  


 


Urine Appearance   Sl cloudy   (CLEAR)  


 


Urine pH   5.5   (4.7-8.0)  


 


Ur Specific Gravity   >= 1.030   (1.005-1.035)  


 


Urine Protein   30 H   (<30 mg/dL)  mg/dL


 


Urine Glucose (UA)   Negative   (NEGATIVE)  mg/dL


 


Urine Ketones   Trace H   (NEGATIVE)  mg/dL


 


Urine Blood   Negative   (NEGATIVE)  


 


Urine Nitrate   Negative   (NEGATIVE)  


 


Urine Bilirubin   Negative   (NEGATIVE)  


 


Urine Urobilinogen   0.2   (<1 E.U./dL)  E.U./dL


 


Ur Leukocyte Esterase   Negative   (NEGATIVE)  Yovana/uL


 


Urine RBC   0 - 2   (0-2)  /hpf


 


Urine WBC   1 - 3   (0-6)  /hpf


 


Ur Epithelial Cells   0 - 2   (0-5)  /hpf


 


Urine Bacteria   Rare   (NEG)  


 


Ur Random Sodium   51   meq/L


 


Ur Random Potassium   92.4   meq/L


 


Urine Opiates Screen   Negative   (NEGATIVE)  


 


Urine Methadone Screen   Negative   (NEGATIVE)  


 


Ur Barbiturates Screen   Negative   (NEGATIVE)  


 


Ur Phencyclidine Scrn   Negative   (NEGATIVE)  


 


Ur Amphetamines Screen   Negative   (NEGATIVE)  


 


U Benzodiazepines Scrn   Negative   (NEGATIVE)  


 


U Oth Cocaine Metabols   Negative   (NEGATIVE)  


 


U Cannabinoids Screen   Negative   (NEGATIVE)  














  03/20/17 Range/Units





  21:05 


 


WBC   (4.5-11.0)  10^3/ul


 


RBC   (3.5-6.1)  10^6/uL


 


Hgb   (14.0-18.0)  gm/dL


 


Hct   (42.0-52.0)  %


 


MCV   (80.0-105.0)  fL


 


MCH   (25.0-35.0)  pg


 


MCHC   (31.0-37.0)  g/dl


 


RDW   (11.5-14.5)  %


 


Plt Count   (120.0-450.0)  10^3/uL


 


MPV   (7.0-11.0)  fl


 


Gran %   (50.0-68.0)  %


 


Lymph % (Auto)   (22.0-35.0)  %


 


Mono % (Auto)   (1.0-6.0)  %


 


Eos % (Auto)   (1.5-5.0)  %


 


Baso % (Auto)   (0.0-3.0)  %


 


Gran #   (1.4-6.5)  


 


Lymph #   (1.2-3.4)  


 


Mono #   (0.1-0.6)  


 


Eos #   (0.0-0.7)  


 


Baso #   (0.0-2.0)  K/mm3


 


PT   (9.9-11.8)  Seconds


 


INR   (0.93-1.08)  


 


APTT   (23.7-30.8)  Seconds


 


pO2  31  (30-55)  mm/Hg


 


VBG pH  7.21 L  (7.32-7.43)  


 


VBG pCO2  65.0 H  (40-60)  


 


VBG HCO3  26.0  (21-28)  mmol/l


 


VBG Total CO2  28.0  (22-28)  mmol.L


 


VBG O2 Sat (Calc)  50.1  (40-65)  %


 


VBG Base Excess  -3.2 L  (0.0-2.0)  mmol/L


 


VBG Potassium  6.2 H*  (3.6-5.2)  mmol/L


 


Sodium  141.0  (132-148)  mmol/L


 


Chloride  109.0 H  ()  mmol/L


 


Glucose  124 H  ()  mg/dl


 


Lactate  4.2 H*  (0.7-2.1)  mmol/L


 


FiO2  21.0  %


 


Potassium  6.3 H* D  (3.6-5.0)  mmol/L


 


Carbon Dioxide  24  (21-33)  mmol/L


 


Anion Gap  18  (10-20)  


 


BUN  27 H  (7-21)  mg/dL


 


Creatinine  1.1  (0.5-1.4)  mg/dL


 


Est GFR (African Amer)  > 60  


 


Est GFR (Non-Af Amer)  > 60  


 


POC Glucose (mg/dL)   ()  mg/dL


 


Random Glucose  123 H  ()  mg/dL


 


Calcium  8.6  (8.4-10.5)  mg/dL


 


Phosphorus   (2.5-4.5)  mg/dL


 


Magnesium  1.9  (1.7-2.2)  mg/dL


 


Total Bilirubin   (0.2-1.3)  mg/dL


 


AST   (15-59)  U/L


 


ALT   (7-56)  U/L


 


Alkaline Phosphatase   ()  U/L


 


Troponin I  < 0.01  ng/mL


 


Total Protein   (5.8-8.3)  g/dL


 


Albumin   (3.0-4.8)  g/dL


 


Globulin   gm/dL


 


Albumin/Globulin Ratio   (1.1-1.8)  


 


Procalcitonin  0.07 L  (0.19-0.49)  NG/ML


 


Venous Blood Potassium  6.2 H*  (3.6-5.2)  mmol/L


 


Urine Color   (YELLOW)  


 


Urine Appearance   (CLEAR)  


 


Urine pH   (4.7-8.0)  


 


Ur Specific Gravity   (1.005-1.035)  


 


Urine Protein   (<30 mg/dL)  mg/dL


 


Urine Glucose (UA)   (NEGATIVE)  mg/dL


 


Urine Ketones   (NEGATIVE)  mg/dL


 


Urine Blood   (NEGATIVE)  


 


Urine Nitrate   (NEGATIVE)  


 


Urine Bilirubin   (NEGATIVE)  


 


Urine Urobilinogen   (<1 E.U./dL)  E.U./dL


 


Ur Leukocyte Esterase   (NEGATIVE)  Yovana/uL


 


Urine RBC   (0-2)  /hpf


 


Urine WBC   (0-6)  /hpf


 


Ur Epithelial Cells   (0-5)  /hpf


 


Urine Bacteria   (NEG)  


 


Ur Random Sodium   meq/L


 


Ur Random Potassium   meq/L


 


Urine Opiates Screen   (NEGATIVE)  


 


Urine Methadone Screen   (NEGATIVE)  


 


Ur Barbiturates Screen   (NEGATIVE)  


 


Ur Phencyclidine Scrn   (NEGATIVE)  


 


Ur Amphetamines Screen   (NEGATIVE)  


 


U Benzodiazepines Scrn   (NEGATIVE)  


 


U Oth Cocaine Metabols   (NEGATIVE)  


 


U Cannabinoids Screen   (NEGATIVE)  








 Laboratory Results - last 24 hr











  03/20/17 03/20/17 03/20/17





  21:05 22:03 23:15


 


WBC   


 


RBC   


 


Hgb   


 


Hct   


 


MCV   


 


MCH   


 


MCHC   


 


RDW   


 


Plt Count   


 


MPV   


 


Gran %   


 


Lymph % (Auto)   


 


Mono % (Auto)   


 


Eos % (Auto)   


 


Baso % (Auto)   


 


Gran #   


 


Lymph #   


 


Mono #   


 


Eos #   


 


Baso #   


 


PT   


 


INR   


 


APTT   


 


pO2  31  


 


VBG pH  7.21 L  


 


VBG pCO2  65.0 H  


 


VBG HCO3  26.0  


 


VBG Total CO2  28.0  


 


VBG O2 Sat (Calc)  50.1  


 


VBG Base Excess  -3.2 L  


 


VBG Potassium  6.2 H*  


 


Sodium  140  


 


Chloride  104  


 


Glucose  124 H  


 


Lactate  4.2 H*  


 


FiO2  21.0  


 


Potassium  6.3 H* D  


 


Carbon Dioxide  24  


 


Anion Gap  18  


 


BUN  27 H  


 


Creatinine  1.1  


 


Est GFR ( Amer)  > 60  


 


Est GFR (Non-Af Amer)  > 60  


 


POC Glucose (mg/dL)   191 H 


 


Random Glucose  123 H  


 


Calcium  8.6  


 


Phosphorus   


 


Magnesium  1.9  


 


Total Bilirubin   


 


AST   


 


ALT   


 


Alkaline Phosphatase   


 


Troponin I  < 0.01  


 


Total Protein   


 


Albumin   


 


Globulin   


 


Albumin/Globulin Ratio   


 


Procalcitonin  0.07 L  


 


Venous Blood Potassium  6.2 H*  


 


Urine Color    Yellow


 


Urine Appearance    Sl cloudy


 


Urine pH    5.5


 


Ur Specific Gravity    >= 1.030


 


Urine Protein    30 H


 


Urine Glucose (UA)    Negative


 


Urine Ketones    Trace H


 


Urine Blood    Negative


 


Urine Nitrate    Negative


 


Urine Bilirubin    Negative


 


Urine Urobilinogen    0.2


 


Ur Leukocyte Esterase    Negative


 


Urine RBC    0 - 2


 


Urine WBC    1 - 3


 


Ur Epithelial Cells    0 - 2


 


Urine Bacteria    Rare


 


Ur Random Sodium    51


 


Ur Random Potassium    92.4


 


Urine Opiates Screen    Negative


 


Urine Methadone Screen    Negative


 


Ur Barbiturates Screen    Negative


 


Ur Phencyclidine Scrn    Negative


 


Ur Amphetamines Screen    Negative


 


U Benzodiazepines Scrn    Negative


 


U Oth Cocaine Metabols    Negative


 


U Cannabinoids Screen    Negative














  03/21/17 03/21/17 03/21/17





  01:10 06:20 07:20


 


WBC   11.2 H D 


 


RBC   3.98 


 


Hgb   8.0 L 


 


Hct   27.0 L 


 


MCV   67.8 L 


 


MCH   20.1 L 


 


MCHC   29.6 L 


 


RDW   18.7 H 


 


Plt Count   388 


 


MPV   9.2 


 


Gran %   69.5 H 


 


Lymph % (Auto)   23.4 


 


Mono % (Auto)   6.6 H 


 


Eos % (Auto)   0.4 L 


 


Baso % (Auto)   0.1 


 


Gran #   7.76 H 


 


Lymph #   2.6 


 


Mono #   0.7 H 


 


Eos #   0.1 


 


Baso #   0.01 


 


PT   11.7 


 


INR   1.08 


 


APTT   24.0 


 


pO2  44  


 


VBG pH  7.34  


 


VBG pCO2  51.0  


 


VBG HCO3  27.5  


 


VBG Total CO2  29.1 H  


 


VBG O2 Sat (Calc)  75.6 H  


 


VBG Base Excess  0.9  


 


VBG Potassium  4.2  


 


Sodium  141.0  142 


 


Chloride  113.0 H  105 


 


Glucose  82  


 


Lactate  2.3 H  


 


FiO2  21.0  


 


Potassium   3.8 


 


Carbon Dioxide   25 


 


Anion Gap   16 


 


BUN   20 


 


Creatinine   0.9 


 


Est GFR ( Amer)   > 60 


 


Est GFR (Non-Af Amer)   > 60 


 


POC Glucose (mg/dL)    128 H


 


Random Glucose   97 


 


Calcium   8.3 L 


 


Phosphorus   4.5 


 


Magnesium   1.8 


 


Total Bilirubin   0.4 


 


AST   141 H 


 


ALT   117 H 


 


Alkaline Phosphatase   63 


 


Troponin I   0.02  D 


 


Total Protein   6.2 


 


Albumin   3.4 


 


Globulin   2.8 


 


Albumin/Globulin Ratio   1.2 


 


Procalcitonin   


 


Venous Blood Potassium  4.2  


 


Urine Color   


 


Urine Appearance   


 


Urine pH   


 


Ur Specific Gravity   


 


Urine Protein   


 


Urine Glucose (UA)   


 


Urine Ketones   


 


Urine Blood   


 


Urine Nitrate   


 


Urine Bilirubin   


 


Urine Urobilinogen   


 


Ur Leukocyte Esterase   


 


Urine RBC   


 


Urine WBC   


 


Ur Epithelial Cells   


 


Urine Bacteria   


 


Ur Random Sodium   


 


Ur Random Potassium   


 


Urine Opiates Screen   


 


Urine Methadone Screen   


 


Ur Barbiturates Screen   


 


Ur Phencyclidine Scrn   


 


Ur Amphetamines Screen   


 


U Benzodiazepines Scrn   


 


U Oth Cocaine Metabols   


 


U Cannabinoids Screen   














  03/21/17 03/21/17 03/21/17





  08:45 11:23 15:00


 


WBC   


 


RBC   


 


Hgb   


 


Hct   


 


MCV   


 


MCH   


 


MCHC   


 


RDW   


 


Plt Count   


 


MPV   


 


Gran %   


 


Lymph % (Auto)   


 


Mono % (Auto)   


 


Eos % (Auto)   


 


Baso % (Auto)   


 


Gran #   


 


Lymph #   


 


Mono #   


 


Eos #   


 


Baso #   


 


PT   


 


INR   


 


APTT   


 


pO2  36  


 


VBG pH  7.31 L  


 


VBG pCO2  57.0  


 


VBG HCO3  28.7 H  


 


VBG Total CO2  30.4 H  


 


VBG O2 Sat (Calc)  56.4  


 


VBG Base Excess  1.2  


 


VBG Potassium  4.2  


 


Sodium  141.0  


 


Chloride  109.0 H  


 


Glucose  198 H  


 


Lactate  3.1 H  


 


FiO2  21.0  


 


Potassium   


 


Carbon Dioxide   


 


Anion Gap   


 


BUN   


 


Creatinine   


 


Est GFR ( Amer)   


 


Est GFR (Non-Af Amer)   


 


POC Glucose (mg/dL)   215 H 


 


Random Glucose   


 


Calcium   


 


Phosphorus   


 


Magnesium   


 


Total Bilirubin   


 


AST   


 


ALT   


 


Alkaline Phosphatase   


 


Troponin I    < 0.01  D


 


Total Protein   


 


Albumin   


 


Globulin   


 


Albumin/Globulin Ratio   


 


Procalcitonin   


 


Venous Blood Potassium  4.2  


 


Urine Color   


 


Urine Appearance   


 


Urine pH   


 


Ur Specific Gravity   


 


Urine Protein   


 


Urine Glucose (UA)   


 


Urine Ketones   


 


Urine Blood   


 


Urine Nitrate   


 


Urine Bilirubin   


 


Urine Urobilinogen   


 


Ur Leukocyte Esterase   


 


Urine RBC   


 


Urine WBC   


 


Ur Epithelial Cells   


 


Urine Bacteria   


 


Ur Random Sodium   


 


Ur Random Potassium   


 


Urine Opiates Screen   


 


Urine Methadone Screen   


 


Ur Barbiturates Screen   


 


Ur Phencyclidine Scrn   


 


Ur Amphetamines Screen   


 


U Benzodiazepines Scrn   


 


U Oth Cocaine Metabols   


 


U Cannabinoids Screen   














  03/21/17





  16:03


 


WBC 


 


RBC 


 


Hgb 


 


Hct 


 


MCV 


 


MCH 


 


MCHC 


 


RDW 


 


Plt Count 


 


MPV 


 


Gran % 


 


Lymph % (Auto) 


 


Mono % (Auto) 


 


Eos % (Auto) 


 


Baso % (Auto) 


 


Gran # 


 


Lymph # 


 


Mono # 


 


Eos # 


 


Baso # 


 


PT 


 


INR 


 


APTT 


 


pO2 


 


VBG pH 


 


VBG pCO2 


 


VBG HCO3 


 


VBG Total CO2 


 


VBG O2 Sat (Calc) 


 


VBG Base Excess 


 


VBG Potassium 


 


Sodium 


 


Chloride 


 


Glucose 


 


Lactate 


 


FiO2 


 


Potassium 


 


Carbon Dioxide 


 


Anion Gap 


 


BUN 


 


Creatinine 


 


Est GFR ( Amer) 


 


Est GFR (Non-Af Amer) 


 


POC Glucose (mg/dL)  220 H


 


Random Glucose 


 


Calcium 


 


Phosphorus 


 


Magnesium 


 


Total Bilirubin 


 


AST 


 


ALT 


 


Alkaline Phosphatase 


 


Troponin I 


 


Total Protein 


 


Albumin 


 


Globulin 


 


Albumin/Globulin Ratio 


 


Procalcitonin 


 


Venous Blood Potassium 


 


Urine Color 


 


Urine Appearance 


 


Urine pH 


 


Ur Specific Gravity 


 


Urine Protein 


 


Urine Glucose (UA) 


 


Urine Ketones 


 


Urine Blood 


 


Urine Nitrate 


 


Urine Bilirubin 


 


Urine Urobilinogen 


 


Ur Leukocyte Esterase 


 


Urine RBC 


 


Urine WBC 


 


Ur Epithelial Cells 


 


Urine Bacteria 


 


Ur Random Sodium 


 


Ur Random Potassium 


 


Urine Opiates Screen 


 


Urine Methadone Screen 


 


Ur Barbiturates Screen 


 


Ur Phencyclidine Scrn 


 


Ur Amphetamines Screen 


 


U Benzodiazepines Scrn 


 


U Oth Cocaine Metabols 


 


U Cannabinoids Screen 











EKG/Cardiology Studies: 


Cardiology / EKG Studies





03/20/17 21:16


EKG [ELECTROCARDIOGRAM] Stat 


   Comment: 


   Reason For Exam: CHEST PAIN





03/21/17


EKG [ELECTROCARDIOGRAM] Urgent 


   Comment: 


   Reason For Exam: bradycardia


EKG [ELECTROCARDIOGRAM] Urgent 


   Comment: 


   Reason For Exam: Eliel














Critical Care Progress Note





- Nutrition


Nutrition: 


 Nutrition











 Category Date Time Status


 


 Heart Healthy Diet [DIET] Diets  03/21/17 Breakfast Ordered














Addendum


Addendum: 





03/21/17 18:02


patient was seen, examined and discussed shoulder to shoulder at bedside with 

Dr. Lc Sanchez. Her note reflects my exam, assessment and plan, except as below. 

Meds/Labs/ONE reviewed.





62 yo with bradycardia due to bb overdose, now completely resolved. Patient is 

hemodynamically and respiratory wise stable. Not in respiratory or otherwise 

duistress. Protecting his airways. Ok to downgrade to tele





ccm time 40 min

## 2017-03-21 NOTE — CARD
--------------- APPROVED REPORT --------------





EKG Measurement

Heart [id number]

[id number]

[id number]

[id number]

[id number]



<Conclusion>

Normal sinus rhythm

Right bundle branch block

Abnormal ECG

## 2017-03-21 NOTE — CARD
--------------- APPROVED REPORT --------------





EKG Measurement

Heart [id number]

[id number]

[id number]

[id number]



<Conclusion>

Junctional rhythm with retrograde conduction

Right bundle branch block

Abnormal ECG

## 2017-03-22 VITALS — OXYGEN SATURATION: 99 %

## 2017-03-22 LAB
ADD MANUAL DIFF?: NO
ALBUMIN/GLOB SERPL: 1.2 {RATIO} (ref 1.1–1.8)
ALP SERPL-CCNC: 64 U/L (ref 38–133)
ALT SERPL-CCNC: 205 U/L (ref 7–56)
AST SERPL-CCNC: 205 U/L (ref 15–59)
BASOPHILS # BLD AUTO: 0.02 K/MM3 (ref 0–2)
BASOPHILS NFR BLD: 0.2 % (ref 0–3)
BILIRUB SERPL-MCNC: 0.5 MG/DL (ref 0.2–1.3)
BUN SERPL-MCNC: 14 MG/DL (ref 7–21)
CALCIUM SERPL-MCNC: 8.5 MG/DL (ref 8.4–10.5)
CHLORIDE SERPL-SCNC: 103 MMOL/L (ref 98–107)
CO2 SERPL-SCNC: 28 MMOL/L (ref 21–33)
EOSINOPHIL # BLD: 0.2 10*3/UL (ref 0–0.7)
EOSINOPHIL NFR BLD: 2.5 % (ref 1.5–5)
ERYTHROCYTE [DISTWIDTH] IN BLOOD BY AUTOMATED COUNT: 19.1 % (ref 11.5–14.5)
GLOBULIN SER-MCNC: 2.9 GM/DL
GLUCOSE SERPL-MCNC: 124 MG/DL (ref 70–110)
GRANULOCYTES # BLD: 5.89 10*3/UL (ref 1.4–6.5)
GRANULOCYTES NFR BLD: 66.2 % (ref 50–68)
HCT VFR BLD CALC: 29 % (ref 42–52)
IRON SERPL-MCNC: 15 UG/DL (ref 45–180)
LYMPHOCYTES # BLD: 2 10*3/UL (ref 1.2–3.4)
LYMPHOCYTES NFR BLD AUTO: 22.4 % (ref 22–35)
MAGNESIUM SERPL-MCNC: 1.8 MG/DL (ref 1.7–2.2)
MCH RBC QN AUTO: 19.9 PG (ref 25–35)
MCHC RBC AUTO-ENTMCNC: 29.3 G/DL (ref 31–37)
MCV RBC AUTO: 67.9 FL (ref 80–105)
MONOCYTES # BLD AUTO: 0.8 10*3/UL (ref 0.1–0.6)
MONOCYTES NFR BLD: 8.7 % (ref 1–6)
PHOSPHATE SERPL-MCNC: 2.8 MG/DL (ref 2.5–4.5)
PLATELET # BLD: 412 10^3/UL (ref 120–450)
PMV BLD AUTO: 9.2 FL (ref 7–11)
POTASSIUM SERPL-SCNC: 4.2 MMOL/L (ref 3.6–5)
PROT SERPL-MCNC: 6.5 G/DL (ref 5.8–8.3)
SODIUM SERPL-SCNC: 139 MMOL/L (ref 132–148)
TROPONIN I SERPL-MCNC: < 0.01 NG/ML
TROPONIN I SERPL-MCNC: < 0.01 NG/ML
WBC # BLD AUTO: 8.9 10^3/UL (ref 4.5–11)

## 2017-03-22 PROCEDURE — 30233N1 TRANSFUSION OF NONAUTOLOGOUS RED BLOOD CELLS INTO PERIPHERAL VEIN, PERCUTANEOUS APPROACH: ICD-10-PCS | Performed by: INTERNAL MEDICINE

## 2017-03-22 RX ADMIN — INSULIN HUMAN SCH: 100 INJECTION, SOLUTION PARENTERAL at 22:29

## 2017-03-22 RX ADMIN — INSULIN HUMAN SCH: 100 INJECTION, SOLUTION PARENTERAL at 09:44

## 2017-03-22 RX ADMIN — PANTOPRAZOLE SODIUM SCH MG: 40 TABLET, DELAYED RELEASE ORAL at 09:53

## 2017-03-22 RX ADMIN — INSULIN HUMAN SCH: 100 INJECTION, SOLUTION PARENTERAL at 12:38

## 2017-03-22 RX ADMIN — VANCOMYCIN HYDROCHLORIDE SCH: 1 INJECTION, POWDER, LYOPHILIZED, FOR SOLUTION INTRAVENOUS at 09:54

## 2017-03-22 RX ADMIN — INSULIN HUMAN SCH: 100 INJECTION, SOLUTION PARENTERAL at 18:57

## 2017-03-22 NOTE — CARD
--------------- APPROVED REPORT --------------





EKG Measurement

Heart [id number]

[id number]

[id number]

[id number]

[id number]



<Conclusion>

Normal sinus rhythm

Right bundle branch block

Abnormal ECG

## 2017-03-22 NOTE — PN
DATE: 03/22/2017



The patient's heart rate is back to normal off beta blockers.



PHYSICAL EXAMINATION:

VITAL SIGNS:  Blood pressure 147/56, the heart rate is in the 70s.

NECK:  Negative JVD.

LUNGS:  Without rales.

HEART:  Reveals S1, S2.

EXTREMITIES:  Without edema.



LABORATORY DATA:  Hemoglobin is 8.5.  Chemistries:  Transaminases are still elevated.



Glucose 124.



IMPRESSION:

1.  Resolution of bradycardia once the patient has been off his beta blockers.

2.  History of dizziness which is now resolved.

3.  Nonobstructive coronary artery disease.

4.  Chronic obstructive pulmonary disease.

5.  Diabetes mellitus.

6.  Persistent anemia.



PLAN:  Given these findings, the patient's cardiac status is stable.  The patient will need to underg
o workup for his anemia which is chronic, that would explain his weakness.



This should be able to be done as an outpatient.





__________________________________________

Lance Cortés MD







cc:



DD: 03/22/2017 10:09:44  [id number]

TT: 03/22/2017 10:25:07

Confirmation # [id number]

Dictation # [id number]

an

## 2017-03-22 NOTE — CP.PCM.CON
<Isai Anderson - Last Filed: 03/22/17 13:34>





History of Present Illness





- History of Present Illness


History of Present Illness: 


PGY4 GI Fellow Consult Note


Patient is a 62yo male with PMHx significant for CAD, HCV, HTN, DM, asthma, 

prior EtOH abuse who presented to the ED following syncopal episode at home. 

The patient returned home from work, passed a BM and on walking to his bedroom, 

fainted and was found by his son. There is no documentation of LOC. EMS were 

contacted and patient was noted to have HR of 20 on initial evaluation; pt is 

on Metoprolol 50mg PO BID. He was brought to the ED where he required 

transcutaneous pacing. Our service was consulted for abnormal LFTs and anemia. 

Patient denies any abdominal pain, nausea, vomiting, hemtochezia, melena.





PMHx: See HPI


PSHx: inguinal hernia repair, PCI without intervention


FHx: Mother - CAD


Social: +tobacco use, denies EtOH (prior use) or illicit drug use


Endo: Colonoscopy with 12 polyps removed at [facility]; EGD with gastritis 

per patient





Review of Systems





- Constitutional


Constitutional: Fatigue.  absent: Anorexia, Chills, Fever, Malaise





- EENT


Eyes: absent: Change in Vision


Nose/Mouth/Throat: absent: Sore Throat





- Cardiovascular


Cardiovascular: absent: Chest Pain, Dyspnea, Rapid Heart Rate





- Respiratory


Respiratory: absent: Cough, Dyspnea, Excessive Mucous Production





- Gastrointestinal


Gastrointestinal: absent: Abdominal Pain, Constipation, Cramping, Diarrhea, 

Dyspepsia, Dysphagia, Hematemesis, Hematochezia, Loose Stools, Melena, Nausea, 

Odynophagia, Vomiting





- Genitourinary


Genitourinary: absent: Dysuria, Urinary Frequency, Urinary Urgency





- Musculoskeletal


Musculoskeletal: absent: Back Pain, Neck Pain





- Integumentary


Integumentary: absent: New Lesions, Rash





- Neurological


Neurological: absent: Dizziness, Numbness, Focal Weakness





- Psychiatric


Psychiatric: absent: Anxiety, Depression





- Endocrine


Endocrine: absent: Polydipsia, Polyphagia, Polyuria





- Hematologic/Lymphatic


Hematologic: absent: Easy Bleeding, Easy Bruising, Lymphadenopathy





Past Patient History





- Infectious Disease


Hx of Infectious Diseases: None





- Tetanus Immunizations


Tetanus Immunization: Unknown





- Past Social History


Smoking Status: Current Some Days Smoker





- CARDIAC


Hx Cardiac Disorders: Yes


Hx Hypercholesterolemia: Yes


Hx Hypertension: Yes





- PULMONARY


Hx Respiratory Disorders: Yes


Hx Asthma: Yes


Hx Chronic Obstructive Pulmonary Disease (COPD): Yes





- NEUROLOGICAL


Hx Neurological Disorder: No





- HEENT


Hx HEENT Problems: Yes


Hx Deafness: Yes (right ear [place])





- RENAL


Hx Chronic Kidney Disease: No





- ENDOCRINE/METABOLIC


Hx Endocrine Disorders: Yes


Hx Diabetes Mellitus Type 2: Yes (iddm)





- HEMATOLOGICAL/ONCOLOGICAL


Hx Blood Disorders: No





- INTEGUMENTARY


Hx Dermatological Problems: No





- MUSCULOSKELETAL/RHEUMATOLOGICAL


Hx Arthritis: Yes


Hx Falls: No





- GASTROINTESTINAL


Hx Gastrointestinal Disorders: No





- GENITOURINARY/GYNECOLOGICAL


Hx Genitourinary Disorders: No





- PSYCHIATRIC


Hx Psychophysiologic Disorder: No


Hx Substance Use: No





- SURGICAL HISTORY


Hx Surgeries: No


Hx Cardiac Catheterization: Yes (negative 6/2016)


Other/Comment: colonoscopy/endoscopy





- ANESTHESIA


Hx Anesthesia: Yes


Hx Anesthesia Reactions: No


Hx Malignant Hyperthermia: No





Meds


Allergies/Adverse Reactions: 


 Allergies











Allergy/AdvReac Type Severity Reaction Status Date / Time


 


No Known Allergies Allergy   Verified 03/20/17 13:41














- Medications


Medications: 


 Current Medications





Aspirin (Ecotrin)  81 mg PO DAILY [facility]


   Last Admin: 03/22/17 09:54 Dose:  81 mg


Insulin Human Regular (Humulin R Med)  0 units SC ACHS [facility]


   PRN Reason: Protocol


   Last Admin: 03/22/17 09:44 Dose:  Not Given


Ondansetron HCl (Zofran Inj)  4 mg IVP Q6H PRN


   PRN Reason: Nausea/Vomiting


Pantoprazole Sodium (Protonix Ec Tab)  40 mg PO DAILY [facility]


   Last Admin: 03/22/17 09:53 Dose:  40 mg











Physical Exam





- Constitutional


Appears: Non-toxic, No Acute Distress





- Eye Exam


Eye Exam: EOMI, PERRL





- ENT Exam


ENT Exam: Mucous Membranes Moist





- Cardiovascular Exam


Cardiovascular Exam: RRR, +S1, +S2





- GI/Abdominal Exam


GI & Abdominal Exam: Normal Bowel Sounds, Soft, Tenderness (B/L flanks).  absent

: Distended, Firm, Guarding, Rigid





- Extremities Exam


Extremities exam: Positive for: normal inspection.  Negative for: pedal edema





- Neurological Exam


Neurological exam: Alert, Oriented x3





- Psychiatric Exam


Psychiatric exam: Normal Affect, Normal Mood





- Skin


Skin Exam: Dry, Warm





Results





- Vital Signs


Recent Vital Signs: 


 Last Vital Signs











Temp  98.0 F   03/22/17 06:00


 


Pulse  69   03/22/17 06:00


 


Resp  20   03/22/17 06:00


 


BP  147/56 L  03/22/17 06:00


 


Pulse Ox  99   03/22/17 06:00














- Labs


Result Diagrams: 


 03/22/17 07:00





 03/22/17 07:00


Labs: 


 Laboratory Results - last 24 hr











  03/20/17 03/21/17 03/21/17





  21:05 07:20 11:23


 


WBC   


 


RBC   


 


Hgb   


 


Hct   


 


MCV   


 


MCH   


 


MCHC   


 


RDW   


 


Plt Count   


 


MPV   


 


Gran %   


 


Lymph % (Auto)   


 


Mono % (Auto)   


 


Eos % (Auto)   


 


Baso % (Auto)   


 


Gran #   


 


Lymph #   


 


Mono #   


 


Eos #   


 


Baso #   


 


Sodium   


 


Potassium   


 


Chloride   


 


Carbon Dioxide   


 


Anion Gap   


 


BUN   


 


Creatinine   


 


Est GFR ( Amer)   


 


Est GFR (Non-Af Amer)   


 


POC Glucose (mg/dL)   128 H  215 H


 


Random Glucose   


 


Calcium   


 


Phosphorus   


 


Magnesium   


 


Iron   


 


TIBC   


 


% Saturation   


 


Total Bilirubin   


 


AST   


 


ALT   


 


Alkaline Phosphatase   


 


Troponin I   


 


Total Protein   


 


Albumin   


 


Globulin   


 


Albumin/Globulin Ratio   


 


Procalcitonin  0.07 L  














  03/21/17 03/21/17 03/21/17





  15:00 16:03 21:46


 


WBC   


 


RBC   


 


Hgb   


 


Hct   


 


MCV   


 


MCH   


 


MCHC   


 


RDW   


 


Plt Count   


 


MPV   


 


Gran %   


 


Lymph % (Auto)   


 


Mono % (Auto)   


 


Eos % (Auto)   


 


Baso % (Auto)   


 


Gran #   


 


Lymph #   


 


Mono #   


 


Eos #   


 


Baso #   


 


Sodium   


 


Potassium   


 


Chloride   


 


Carbon Dioxide   


 


Anion Gap   


 


BUN   


 


Creatinine   


 


Est GFR ( Amer)   


 


Est GFR (Non-Af Amer)   


 


POC Glucose (mg/dL)   220 H  143 H


 


Random Glucose   


 


Calcium   


 


Phosphorus   


 


Magnesium   


 


Iron   


 


TIBC   


 


% Saturation   


 


Total Bilirubin   


 


AST   


 


ALT   


 


Alkaline Phosphatase   


 


Troponin I  < 0.01  D  


 


Total Protein   


 


Albumin   


 


Globulin   


 


Albumin/Globulin Ratio   


 


Procalcitonin   














  03/22/17 03/22/17 03/22/17





  07:00 07:22 10:45


 


WBC  8.9  D  


 


RBC  4.27  


 


Hgb  8.5 L  


 


Hct  29.0 L  


 


MCV  67.9 L  


 


MCH  19.9 L  


 


MCHC  29.3 L  


 


RDW  19.1 H  


 


Plt Count  412  


 


MPV  9.2  


 


Gran %  66.2  


 


Lymph % (Auto)  22.4  


 


Mono % (Auto)  8.7 H  


 


Eos % (Auto)  2.5  


 


Baso % (Auto)  0.2  


 


Gran #  5.89  


 


Lymph #  2.0  


 


Mono #  0.8 H  


 


Eos #  0.2  


 


Baso #  0.02  


 


Sodium  139  


 


Potassium  4.2  


 


Chloride  103  


 


Carbon Dioxide  28  


 


Anion Gap  12  


 


BUN  14  


 


Creatinine  0.8  


 


Est GFR ( Amer)  > 60  


 


Est GFR (Non-Af Amer)  > 60  


 


POC Glucose (mg/dL)   138 H 


 


Random Glucose  124 H  


 


Calcium  8.5  


 


Phosphorus  2.8  


 


Magnesium  1.8  


 


Iron    15 L


 


TIBC    413


 


% Saturation    3 L


 


Total Bilirubin  0.5  


 


AST  205 H  


 


ALT  205 H  


 


Alkaline Phosphatase  64  


 


Troponin I   


 


Total Protein  6.5  


 


Albumin  3.5  


 


Globulin  2.9  


 


Albumin/Globulin Ratio  1.2  


 


Procalcitonin   














Assessment & Plan





- Assessment and Plan (Free Text)


Assessment: 


Patient is a 62yo male with PMHx significant for CAD, HCV, HTN, DM, asthma, 

prior EtOH abuse who presented to the ED following syncopal episode at home.


-Syncopal episode 2/2 bradycardia on metoprolol


-Transaminasemia


-Anemia


-Nonobstructive CAD


Plan: 


-Syncope attributed to beta blockade with initial HR of 20bpm; improved off of 

therapy and currently asymptomatic


-Transaminasemia likely 2/2 muscle leak following transcutaneous pacing; does 

have history of HCV - serologies ordered


-Patient denies any overt bleeding and none noted since admission; recent 

colonoscopy with polypecotomy - recommend outpatient follow up with patient's 

primary gastroenterologist at [facility]


-Cardiology following


-Patient can be D/C with close outpatient follow up with cardiology and patient'

s GI





- Date & Time


Date: 03/22/17


Time: 11:00





<Alexandre Collins MD - Last Filed: 03/22/17 17:38>





Meds





- Medications


Medications: 


 Current Medications





Aspirin (Ecotrin)  81 mg PO DAILY [facility]


   Last Admin: 03/22/17 09:54 Dose:  81 mg


Insulin Human Regular (Humulin R Med)  0 units SC ACHS [facility]


   PRN Reason: Protocol


   Last Admin: 03/22/17 12:38 Dose:  Not Given


Lisinopril (Zestril)  10 mg PO DAILY [facility]


   Last Admin: 03/22/17 13:54 Dose:  10 mg


Ondansetron HCl (Zofran Inj)  4 mg IVP Q6H PRN


   PRN Reason: Nausea/Vomiting


Pantoprazole Sodium (Protonix Ec Tab)  40 mg PO DAILY [facility]


   Last Admin: 03/22/17 09:53 Dose:  40 mg











Results





- Vital Signs


Recent Vital Signs: 


 Last Vital Signs











Temp  98.0 F   03/22/17 16:29


 


Pulse  87   03/22/17 16:29


 


Resp  14   03/22/17 16:29


 


BP  143/87   03/22/17 16:29


 


Pulse Ox  99   03/22/17 06:00














- Labs


Result Diagrams: 


 03/22/17 07:00





 03/22/17 07:00


Labs: 


 Laboratory Results - last 24 hr











  03/21/17 03/22/17 03/22/17





  21:46 07:00 07:22


 


WBC   8.9  D 


 


RBC   4.27 


 


Hgb   8.5 L 


 


Hct   29.0 L 


 


MCV   67.9 L 


 


MCH   19.9 L 


 


MCHC   29.3 L 


 


RDW   19.1 H 


 


Plt Count   412 


 


MPV   9.2 


 


Gran %   66.2 


 


Lymph % (Auto)   22.4 


 


Mono % (Auto)   8.7 H 


 


Eos % (Auto)   2.5 


 


Baso % (Auto)   0.2 


 


Gran #   5.89 


 


Lymph #   2.0 


 


Mono #   0.8 H 


 


Eos #   0.2 


 


Baso #   0.02 


 


Sodium   139 


 


Potassium   4.2 


 


Chloride   103 


 


Carbon Dioxide   28 


 


Anion Gap   12 


 


BUN   14 


 


Creatinine   0.8 


 


Est GFR ( Amer)   > 60 


 


Est GFR (Non-Af Amer)   > 60 


 


POC Glucose (mg/dL)  143 H   138 H


 


Random Glucose   124 H 


 


Calcium   8.5 


 


Phosphorus   2.8 


 


Magnesium   1.8 


 


Iron   


 


TIBC   


 


% Saturation   


 


Transferrin   


 


Total Bilirubin   0.5 


 


AST   205 H 


 


ALT   205 H 


 


Alkaline Phosphatase   64 


 


Total Creatine Kinase   


 


Troponin I   


 


Total Protein   6.5 


 


Albumin   3.5 


 


Globulin   2.9 


 


Albumin/Globulin Ratio   1.2 


 


Blood Type   


 


Antibody Screen   


 


Crossmatch   


 


BBK History Checked   














  03/22/17 03/22/17





  10:45 12:46


 


WBC  


 


RBC  


 


Hgb  


 


Hct  


 


MCV  


 


MCH  


 


MCHC  


 


RDW  


 


Plt Count  


 


MPV  


 


Gran %  


 


Lymph % (Auto)  


 


Mono % (Auto)  


 


Eos % (Auto)  


 


Baso % (Auto)  


 


Gran #  


 


Lymph #  


 


Mono #  


 


Eos #  


 


Baso #  


 


Sodium  


 


Potassium  


 


Chloride  


 


Carbon Dioxide  


 


Anion Gap  


 


BUN  


 


Creatinine  


 


Est GFR ( Amer)  


 


Est GFR (Non-Af Amer)  


 


POC Glucose (mg/dL)  


 


Random Glucose  


 


Calcium  


 


Phosphorus  


 


Magnesium  


 


Iron  15 L 


 


TIBC  413 


 


% Saturation  3 L 


 


Transferrin  358.02 


 


Total Bilirubin  


 


AST  


 


ALT  


 


Alkaline Phosphatase  


 


Total Creatine Kinase   98


 


Troponin I   < 0.01


 


Total Protein  


 


Albumin  


 


Globulin  


 


Albumin/Globulin Ratio  


 


Blood Type   A POSITIVE


 


Antibody Screen   Negative


 


Crossmatch   See Detail


 


BBK History Checked   Patient has bt














Attending/Attestation





- Attestation


I have personally seen and examined this patient.: Yes


I have fully participated in the care of the patient.: Yes


I have reviewed all pertinent clinical information: Yes


Notes (Text): 





03/22/17 17:35


Pt seen with GI fellow on rounds. This is a 62yo male with PMHx significant for 

CAD, HCV, HTN, DM, asthma, prior EtOH abuse who presented to the ED following 

syncopal episode at home. Found to be bradycardia s/p pacing. Now off b 

blocker. GI jones dfor abnormal transminemia and anemia. Has history of 

untreated HCV. Recent colonoscopy with polypectomy- obtain results. No overt 

bleeding. Can be discharged and follow outpatient with me.

## 2017-03-23 VITALS — RESPIRATION RATE: 20 BRPM

## 2017-03-23 VITALS — TEMPERATURE: 97.9 F | HEART RATE: 67 BPM | DIASTOLIC BLOOD PRESSURE: 77 MMHG | SYSTOLIC BLOOD PRESSURE: 140 MMHG

## 2017-03-23 LAB
ALBUMIN/GLOB SERPL: 1.3 {RATIO} (ref 1.1–1.8)
ALP SERPL-CCNC: 75 U/L (ref 38–133)
ALT SERPL-CCNC: 255 U/L (ref 7–56)
AST SERPL-CCNC: 157 U/L (ref 15–59)
BILIRUB SERPL-MCNC: 0.9 MG/DL (ref 0.2–1.3)
BUN SERPL-MCNC: 15 MG/DL (ref 7–21)
CALCIUM SERPL-MCNC: 10.1 MG/DL (ref 8.4–10.5)
CHLORIDE SERPL-SCNC: 105 MMOL/L (ref 98–107)
CO2 SERPL-SCNC: 23 MMOL/L (ref 21–33)
ERYTHROCYTE [DISTWIDTH] IN BLOOD BY AUTOMATED COUNT: 19.6 % (ref 11.5–14.5)
GLOBULIN SER-MCNC: 3.2 GM/DL
GLUCOSE SERPL-MCNC: 127 MG/DL (ref 70–110)
HCT VFR BLD CALC: 37.4 % (ref 42–52)
MAGNESIUM SERPL-MCNC: 1.9 MG/DL (ref 1.7–2.2)
MCH RBC QN AUTO: 21.5 PG (ref 25–35)
MCHC RBC AUTO-ENTMCNC: 30.7 G/DL (ref 31–37)
MCV RBC AUTO: 69.8 FL (ref 80–105)
PHOSPHATE SERPL-MCNC: 4.2 MG/DL (ref 2.5–4.5)
PLATELET # BLD: 381 10^3/UL (ref 120–450)
PMV BLD AUTO: 8.9 FL (ref 7–11)
POTASSIUM SERPL-SCNC: 4.9 MMOL/L (ref 3.6–5)
PROT SERPL-MCNC: 7.2 G/DL (ref 5.8–8.3)
SODIUM SERPL-SCNC: 144 MMOL/L (ref 132–148)
TROPONIN I SERPL-MCNC: < 0.01 NG/ML
WBC # BLD AUTO: 8.1 10^3/UL (ref 4.5–11)

## 2017-03-23 RX ADMIN — PANTOPRAZOLE SODIUM SCH MG: 40 TABLET, DELAYED RELEASE ORAL at 09:40

## 2017-03-23 RX ADMIN — INSULIN HUMAN SCH: 100 INJECTION, SOLUTION PARENTERAL at 08:13

## 2017-03-23 NOTE — CP.PCM.DIS
Provider





- Provider


Date of Admission: 


03/20/17 15:35





Attending physician: 


Hanna Hughes MD





Primary care physician: 


Bre Morrison DO





Consults: 


GI: OMER Collins


Cardio: Cortés


Time Spent in preparation of Discharge (in minutes): 45





Hospital Course





- Lab Results


Lab Results: 


 Micro Results





03/20/17 21:00   Blood-Venous   Blood Culture - Preliminary


                            NO GROWTH AFTER 48 HOURS


03/20/17 20:45   Blood-Venous   Blood Culture - Preliminary


                            NO GROWTH AFTER 48 HOURS


03/20/17 21:40   Nose   MRSA Culture (Admit) - Final


                            MRSA DETECTED


03/20/17 23:15   Urine,Clean Catch   Urine Culture - Final


                            No Growth (<1,000 CFU/ML)





 Most Recent Lab Values











WBC  8.1 10^3/ul (4.5-11.0)   03/23/17  07:00    


 


RBC  5.36 10^6/uL (3.5-6.1)   03/23/17  07:00    


 


Hgb  11.5 gm/dL (14.0-18.0)  L  03/23/17  07:00    


 


Hct  37.4 % (42.0-52.0)  L  03/23/17  07:00    


 


MCV  69.8 fL (80.0-105.0)  L  03/23/17  07:00    


 


MCH  21.5 pg (25.0-35.0)  L  03/23/17  07:00    


 


MCHC  30.7 g/dl (31.0-37.0)  L  03/23/17  07:00    


 


RDW  19.6 % (11.5-14.5)  H  03/23/17  07:00    


 


Plt Count  381 10^3/uL (120.0-450.0)   03/23/17  07:00    


 


MPV  8.9 fl (7.0-11.0)   03/23/17  07:00    


 


Gran %  66.2 % (50.0-68.0)   03/22/17  07:00    


 


Lymph % (Auto)  22.4 % (22.0-35.0)   03/22/17  07:00    


 


Mono % (Auto)  8.7 % (1.0-6.0)  H  03/22/17  07:00    


 


Eos % (Auto)  2.5 % (1.5-5.0)   03/22/17  07:00    


 


Baso % (Auto)  0.2 % (0.0-3.0)   03/22/17  07:00    


 


Gran #  5.89  (1.4-6.5)   03/22/17  07:00    


 


Lymph #  2.0  (1.2-3.4)   03/22/17  07:00    


 


Mono #  0.8  (0.1-0.6)  H  03/22/17  07:00    


 


Eos #  0.2  (0.0-0.7)   03/22/17  07:00    


 


Baso #  0.02 K/mm3 (0.0-2.0)   03/22/17  07:00    


 


PT  11.7 Seconds (9.9-11.8)   03/21/17  06:20    


 


INR  1.08  (0.93-1.08)   03/21/17  06:20    


 


APTT  24.0 Seconds (23.7-30.8)   03/21/17  06:20    


 


pO2  36 mm/Hg (30-55)   03/21/17  08:45    


 


VBG pH  7.31  (7.32-7.43)  L  03/21/17  08:45    


 


VBG pCO2  57.0  (40-60)   03/21/17  08:45    


 


VBG HCO3  28.7 mmol/l (21-28)  H  03/21/17  08:45    


 


VBG Total CO2  30.4 mmol.L (22-28)  H  03/21/17  08:45    


 


VBG O2 Sat (Calc)  56.4 % (40-65)   03/21/17  08:45    


 


VBG Base Excess  1.2 mmol/L (0.0-2.0)   03/21/17  08:45    


 


VBG Potassium  4.2 mmol/L (3.6-5.2)   03/21/17  08:45    


 


Sodium  141.0 mmol/L (132-148)   03/21/17  08:45    


 


Chloride  109.0 mmol/L ()  H  03/21/17  08:45    


 


Glucose  198 mg/dl ()  H  03/21/17  08:45    


 


Lactate  3.1 mmol/L (0.7-2.1)  H  03/21/17  08:45    


 


FiO2  21.0 %  03/21/17  08:45    


 


Sodium  144 mmol/L (132-148)   03/23/17  08:07    


 


Potassium  4.9 mmol/L (3.6-5.0)   03/23/17  08:07    


 


Chloride  105 mmol/L ()   03/23/17  08:07    


 


Carbon Dioxide  23 mmol/L (21-33)   03/23/17  08:07    


 


Anion Gap  21  (10-20)  H  03/23/17  08:07    


 


BUN  15 mg/dL (7-21)   03/23/17  08:07    


 


Creatinine  0.8 mg/dL (0.5-1.4)   03/23/17  08:07    


 


Est GFR ( Amer)  > 60   03/23/17  08:07    


 


Est GFR (Non-Af Amer)  > 60   03/23/17  08:07    


 


POC Glucose (mg/dL)  119 mg/dL ()  H  03/23/17  07:29    


 


Random Glucose  127 mg/dL ()  H  03/23/17  08:07    


 


Calcium  10.1 mg/dL (8.4-10.5)   03/23/17  08:07    


 


Phosphorus  4.2 mg/dL (2.5-4.5)   03/23/17  08:07    


 


Magnesium  1.9 mg/dL (1.7-2.2)   03/23/17  08:07    


 


Iron  15 ug/dL ()  L  03/22/17  10:45    


 


TIBC  413 ug/dL (261-462)   03/22/17  10:45    


 


% Saturation  3 % (20-55)  L  03/22/17  10:45    


 


Transferrin  358.02 mg/dL (206-381)   03/22/17  10:45    


 


Ferritin  8.4 ng/mL  03/22/17  10:45    


 


Total Bilirubin  0.9 mg/dL (0.2-1.3)   03/23/17  08:07    


 


AST  157 U/L (15-59)  H  03/23/17  08:07    


 


ALT  255 U/L (7-56)  H  03/23/17  08:07    


 


Alkaline Phosphatase  75 U/L ()   03/23/17  08:07    


 


Lactate Dehydrogenase  817 U/L (333-699)  H  03/23/17  08:07    


 


Total Creatine Kinase  71 U/L ()   03/23/17  08:07    


 


Myoglobin  <30 mcg/L (< or = 95)   03/22/17  12:46    


 


Troponin I  < 0.01 ng/mL  03/23/17  08:07    


 


NT-Pro-B Natriuret Pep  819 pg/mL (0-450)  H  03/20/17  13:55    


 


Total Protein  7.2 g/dL (5.8-8.3)   03/23/17  08:07    


 


Albumin  4.0 g/dL (3.0-4.8)   03/23/17  08:07    


 


Globulin  3.2 gm/dL  03/23/17  08:07    


 


Albumin/Globulin Ratio  1.3  (1.1-1.8)   03/23/17  08:07    


 


Procalcitonin  0.07 NG/ML (0.19-0.49)  L  03/20/17  21:05    


 


TSH 3rd Generation  2.11 mIU/mL (0.46-4.68)   03/20/17  13:55    


 


Venous Blood Potassium  4.2 mmol/L (3.6-5.2)   03/21/17  08:45    


 


Urine Color  Yellow  (YELLOW)   03/20/17  23:15    


 


Urine Appearance  Sl cloudy  (CLEAR)   03/20/17  23:15    


 


Urine pH  5.5  (4.7-8.0)   03/20/17  23:15    


 


Ur Specific Gravity  >= 1.030  (1.005-1.035)   03/20/17  23:15    


 


Urine Protein  30 mg/dL (<30 mg/dL)  H  03/20/17  23:15    


 


Urine Glucose (UA)  Negative mg/dL (NEGATIVE)   03/20/17  23:15    


 


Urine Ketones  Trace mg/dL (NEGATIVE)  H  03/20/17  23:15    


 


Urine Blood  Negative  (NEGATIVE)   03/20/17  23:15    


 


Urine Nitrate  Negative  (NEGATIVE)   03/20/17  23:15    


 


Urine Bilirubin  Negative  (NEGATIVE)   03/20/17  23:15    


 


Urine Urobilinogen  0.2 E.U./dL (<1 E.U./dL)   03/20/17  23:15    


 


Ur Leukocyte Esterase  Negative Yovana/uL (NEGATIVE)   03/20/17  23:15    


 


Urine RBC  0 - 2 /hpf (0-2)   03/20/17  23:15    


 


Urine WBC  1 - 3 /hpf (0-6)   03/20/17  23:15    


 


Ur Epithelial Cells  0 - 2 /hpf (0-5)   03/20/17  23:15    


 


Urine Bacteria  Rare  (NEG)   03/20/17  23:15    


 


Ur Random Sodium  51 meq/L  03/20/17  23:15    


 


Ur Random Potassium  92.4 meq/L  03/20/17  23:15    


 


Digoxin  < 0.4 ng/mL (0.8-2.0)  L  03/20/17  13:55    


 


Salicylates  < 1 mg/dL (2.0-20.0)  L  03/20/17  13:55    


 


Urine Opiates Screen  Negative  (NEGATIVE)   03/20/17  23:15    


 


Urine Methadone Screen  Negative  (NEGATIVE)   03/20/17  23:15    


 


Ur Barbiturates Screen  Negative  (NEGATIVE)   03/20/17  23:15    


 


Ur Phencyclidine Scrn  Negative  (NEGATIVE)   03/20/17  23:15    


 


Ur Amphetamines Screen  Negative  (NEGATIVE)   03/20/17  23:15    


 


U Benzodiazepines Scrn  Negative  (NEGATIVE)   03/20/17  23:15    


 


U Oth Cocaine Metabols  Negative  (NEGATIVE)   03/20/17  23:15    


 


U Cannabinoids Screen  Negative  (NEGATIVE)   03/20/17  23:15    


 


Blood Type  A POSITIVE   03/22/17  12:46    


 


Antibody Screen  Negative   03/22/17  12:46    


 


Crossmatch  See Detail   03/22/17  12:46    


 


BBK History Checked  Patient has bt   03/22/17  12:46    














Discharge Exam





- Head Exam


Head Exam: ATRAUMATIC, NORMAL INSPECTION, NORMOCEPHALIC





- Eye Exam


Eye Exam: EOMI, Normal appearance, PERRL.  absent: Scleral icterus


Pupil Exam: PERRL





- ENT Exam


ENT Exam: Mucous Membranes Moist, Normal Exam





- Respiratory Exam


Respiratory Exam: Clear to PA & Lateral, NORMAL BREATHING PATTERN, 

UNREMARKABLE.  absent: Wheezes, Respiratory Distress





- Cardiovascular Exam


Cardiovascular Exam: REGULAR RHYTHM, RRR, +S1, +S2.  absent: JVD





- GI/Abdominal Exam


GI & Abdominal Exam: Normal Bowel Sounds, Unremarkable.  absent: Distended, Soft

, Tenderness





- Extremities Exam


Extremities exam: normal inspection





- Back Exam


Back exam: NORMAL INSPECTION





- Neurological Exam


Neurological exam: Alert, CN II-XII Intact, Normal Gait, Oriented x3





- Psychiatric Exam


Psychiatric exam: Normal Affect, Normal Mood





- Skin


Skin Exam: Dry, Intact, Normal Color, Warm





Discharge Plan





- Discharge Medications


Prescriptions: 


Docusate [Colace] 100 mg PO BID #60 cap


Ferrous Sulfate [Feosol] 324 mg PO TID #90 ect


Lisinopril [Zestril] 20 mg PO DAILY #30 tab





- Follow Up Plan


Condition: CRITICAL


Disposition: HOME/ ROUTINE


Additional Instructions: 


Patient is cleared for discharge as per Dr. Hughes





1. Follow up with your Primary Care Physician within one week


2. Follow up with your Primary GI physician as out-patient. Or, you may follow 

up with our GI, Dr. Collins, Call for appointment. 


3. Discontinue use of Metoprolol


4. Start Taking Lisinop[place] 50mg Daily [place] [facility] [place]


5. Start Taking Iron supplements as ordered along with Colace [place] 

[place] [facility] [place]


6. Return to the ER with any concerning syptoms.





New Prescriptions:


Lisinopril 20mg PO Daily #30/0


Colace 100mg PO BID #60/0


Ferrous Sulfate 324mg PO TID #90/0





Continue:


Aspirin 81mg PO Daily


Omeprazole 40mg PO Daily


Metformin 1000mg PO BID


Referrals: 


Bre Morrison DO [Primary Care Provider] - 


Karina RILEY,MD Alexandre [Medical Doctor] -

## 2017-03-23 NOTE — PN
DATE: 03/23/2017



SUBJECTIVE:  The patient is asymptomatic.



PHYSICAL EXAMINATION:

VITAL SIGNS:  Blood pressure 151/57, heart rate is in the 70s.

NECK:  Negative JVD.

LUNGS:  Without rales.

HEART:  Reveals S1, S2.

EXTREMITIES:  Without edema.



LABORATORY DATA:  Hemoglobin is 11.5.  Chemistries are unremarkable.



IMPRESSION:

1.  Resolution of weakness after blood transfusion.

2.  Stable bradycardia once the patient has been off of beta blockers.

3.  Diabetes mellitus.

4.  Coronary artery disease.



PLAN:  Given these findings, the patient is stable for discharge from a cardiac perspective.  The [facility]
ient is scheduled for an outpatient stress test evaluation of his coronary artery disease.





__________________________________________

Lance Cortés MD







cc:



DD: 03/23/2017 10:29:50  307

TT: 03/23/2017 10:38:44

Confirmation # [id number]

Dictation # [id number]

jay

## 2017-05-19 ENCOUNTER — HOSPITAL ENCOUNTER (INPATIENT)
Dept: HOSPITAL 42 - ED | Age: 62
LOS: 3 days | Discharge: HOME | DRG: 138 | End: 2017-05-22
Attending: HOSPITALIST | Admitting: INTERNAL MEDICINE
Payer: MEDICAID

## 2017-05-19 VITALS — BODY MASS INDEX: 27.6 KG/M2

## 2017-05-19 DIAGNOSIS — J45.909: ICD-10-CM

## 2017-05-19 DIAGNOSIS — I48.91: Primary | ICD-10-CM

## 2017-05-19 DIAGNOSIS — Z79.84: ICD-10-CM

## 2017-05-19 DIAGNOSIS — Z79.82: ICD-10-CM

## 2017-05-19 DIAGNOSIS — F17.200: ICD-10-CM

## 2017-05-19 DIAGNOSIS — E78.5: ICD-10-CM

## 2017-05-19 DIAGNOSIS — E11.9: ICD-10-CM

## 2017-05-19 DIAGNOSIS — D64.9: ICD-10-CM

## 2017-05-19 DIAGNOSIS — I10: ICD-10-CM

## 2017-05-19 DIAGNOSIS — E78.00: ICD-10-CM

## 2017-05-19 DIAGNOSIS — J44.9: ICD-10-CM

## 2017-05-19 DIAGNOSIS — B19.20: ICD-10-CM

## 2017-05-19 DIAGNOSIS — I25.10: ICD-10-CM

## 2017-05-19 LAB
ADD MANUAL DIFF?: NO
ALBUMIN/GLOB SERPL: 1.4 {RATIO} (ref 1.1–1.8)
ALP SERPL-CCNC: 56 U/L (ref 38–133)
ALT SERPL-CCNC: 32 U/L (ref 7–56)
APTT BLD: 26.7 SECONDS (ref 23.7–30.8)
AST SERPL-CCNC: 23 U/L (ref 15–59)
BASOPHILS # BLD AUTO: 0.01 K/MM3 (ref 0–2)
BASOPHILS NFR BLD: 0.1 % (ref 0–3)
BILIRUB SERPL-MCNC: 0.6 MG/DL (ref 0.2–1.3)
BUN SERPL-MCNC: 20 MG/DL (ref 7–21)
CALCIUM SERPL-MCNC: 9.8 MG/DL (ref 8.4–10.5)
CHLORIDE SERPL-SCNC: 102 MMOL/L (ref 98–107)
CO2 SERPL-SCNC: 23 MMOL/L (ref 21–33)
EOSINOPHIL # BLD: 0.3 10*3/UL (ref 0–0.7)
EOSINOPHIL NFR BLD: 3.7 % (ref 1.5–5)
ERYTHROCYTE [DISTWIDTH] IN BLOOD BY AUTOMATED COUNT: 24.9 % (ref 11.5–14.5)
ERYTHROCYTE [DISTWIDTH] IN BLOOD BY AUTOMATED COUNT: 25.5 % (ref 11.5–14.5)
GLOBULIN SER-MCNC: 2.9 GM/DL
GLUCOSE SERPL-MCNC: 157 MG/DL (ref 70–110)
GRANULOCYTES # BLD: 4.39 10*3/UL (ref 1.4–6.5)
GRANULOCYTES NFR BLD: 62.3 % (ref 50–68)
HCT VFR BLD CALC: 41.6 % (ref 42–52)
HCT VFR BLD CALC: 43.6 % (ref 42–52)
INR PPP: 0.99 (ref 0.93–1.08)
LYMPHOCYTES # BLD: 1.9 10*3/UL (ref 1.2–3.4)
LYMPHOCYTES NFR BLD AUTO: 26.3 % (ref 22–35)
MCH RBC QN AUTO: 24.9 PG (ref 25–35)
MCH RBC QN AUTO: 25.2 PG (ref 25–35)
MCHC RBC AUTO-ENTMCNC: 32.1 G/DL (ref 31–37)
MCHC RBC AUTO-ENTMCNC: 32.5 G/DL (ref 31–37)
MCV RBC AUTO: 77.6 FL (ref 80–105)
MCV RBC AUTO: 77.6 FL (ref 80–105)
MONOCYTES # BLD AUTO: 0.5 10*3/UL (ref 0.1–0.6)
MONOCYTES NFR BLD: 7.6 % (ref 1–6)
PLATELET # BLD: 272 10^3/UL (ref 120–450)
PLATELET # BLD: 306 10^3/UL (ref 120–450)
PMV BLD AUTO: 9.3 FL (ref 7–11)
PMV BLD AUTO: 9.8 FL (ref 7–11)
POTASSIUM SERPL-SCNC: 4.7 MMOL/L (ref 3.6–5)
PROT SERPL-MCNC: 7 G/DL (ref 5.8–8.3)
SODIUM SERPL-SCNC: 140 MMOL/L (ref 132–148)
TROPONIN I SERPL-MCNC: < 0.01 NG/ML
WBC # BLD AUTO: 7.1 10^3/UL (ref 4.5–11)
WBC # BLD AUTO: 7.2 10^3/UL (ref 4.5–11)

## 2017-05-19 RX ADMIN — INSULIN HUMAN SCH UNITS: 100 INJECTION, SOLUTION PARENTERAL at 16:55

## 2017-05-19 RX ADMIN — INSULIN HUMAN SCH: 100 INJECTION, SOLUTION PARENTERAL at 21:58

## 2017-05-19 RX ADMIN — ENOXAPARIN SODIUM SCH MG: 80 INJECTION SUBCUTANEOUS at 22:06

## 2017-05-19 NOTE — CARD
--------------- APPROVED REPORT --------------





EKG Measurement

Heart [id number]

[id number]

[id number]

[id number]



<Conclusion>

Atrial fibrillation with rapid ventricular response

Right bundle branch block

T wave abnormality, consider inferior ischemia or digitalis effect

Abnormal ECG

## 2017-05-19 NOTE — RAD
HISTORY:

rapid afib, chest pain, dyspnea  



COMPARISON:

03/20/2017 



FINDINGS:



LUNGS:

No active pulmonary disease.



PLEURA:

No significant pleural effusion identified, no pneumothorax apparent.



CARDIOVASCULAR:

Normal.



OSSEOUS STRUCTURES:

No significant abnormalities.



VISUALIZED UPPER ABDOMEN:

Normal.



OTHER FINDINGS:

None.



IMPRESSION:

No active disease.

## 2017-05-19 NOTE — ED PDOC
Arrival/HPI





- General


Chief Complaint: Chest Pain


Time Seen by Provider: 05/19/17 08:19


Historian: Patient





- Critical Care


Critical Care Minutes: 45 minutes





- History of Present Illness


Narrative History of Present Illness (Text): 





05/19/17 08:19





Mor Almazan is a 61 year old male, whose past medical history includes, 

hypertension, high cholesterol, and CAD, who presents to the emergency 

department complaining of intermittent midsternal chest pain since last night 

around 21:00 at rest and shortness of breath this morning. History dictated by 

daughter who acts as patient's .  Patient came to emergency 

department about two months ago for syncopal episodes with similar symptoms. At 

that time, Patient came in bradycardic with a heart rate in the 20s. Patient 

had been on Lopressor chronically. Patient had chest pain and shortness of 

breath as well and was paced in the emergency department. Patient was taken to 

the ICU and converted to NSR there, with negative enzymes results as well. 

Patient's Lopressor prescription was discontinued when he was discharged. 

Patient now returns to the emergency department in A Fib with rates in 120-140, 

as well as lightheadedness and palpitations. Patient notes that he took Aspirin 

today for his symptoms. Patient denies any nausea, vomiting, coughs, or any 

other complaint at this time.





PMD: Dr. Morrison








Time/Duration: 24 hours


Symptom Onset: Gradual


Symptom Course: Unchanged


Severity Level: Mild


Activities at Onset: Rest


Context: Home





Past Medical History





- Provider Review


Nursing Documentation Reviewed: Yes





- Infectious Disease


Hx of Infectious Diseases: None





- Tetanus Immunization


Tetanus Immunization: Unknown





- Cardiac


Hx Cardiac Disorders: Yes


Hx Hypertension: Yes





- Pulmonary


Hx Respiratory Disorders: Yes


Hx Asthma: Yes


Hx Chronic Obstructive Pulmonary Disease (COPD): Yes





- Neurological


Hx Neurological Disorder: No





- HEENT


Hx HEENT Disorder: Yes


Hx Deafness: Yes (right ear [place])





- Renal


Hx Renal Disorder: No





- Endocrine/Metabolic


Hx Endocrine Disorders: Yes


Hx Diabetes Mellitus Type 2: Yes (iddm)





- Hematological/Oncological


Hx Blood Disorders: No





- Integumentary


Hx Dermatological Disorder: No





- Musculoskeletal/Rheumatological


Hx Arthritis: Yes


Hx Falls: No





- Gastrointestinal


Hx Gastrointestinal Disorders: No





- Genitourinary/Gynecological


Hx Genitourinary Disorders: No





- Psychiatric


Hx Psychophysiologic Disorder: No


Hx Substance Use: No





- Surgical History


Hx Cardiac Catheterization: Yes (negative 6/2016)


Other/Comment: colonoscopy/endoscopy





- Anesthesia


Hx Anesthesia: Yes


Hx Anesthesia Reactions: No


Hx Malignant Hyperthermia: No





- Suicidal Assessment


Feels Threatened In Home Enviroment: No





Family/Social History





- Physician Review


Nursing Documentation Reviewed: Yes


Family/Social History: No Known Family HX


Smoking Status: Current Some Days Smoker


Hx Alcohol Use: No


Hx Substance Use: No


Hx Substance Use Treatment: No





Allergies/Home Meds


Allergies/Adverse Reactions: 


Allergies





No Known Allergies Allergy (Verified 05/19/17 08:14)


 








Home Medications: 


 Home Meds











 Medication  Instructions  Recorded  Confirmed


 


MetFORMIN [glucoPHAGE] 1,000 mg PO BID 03/29/16 03/20/17


 


Aspirin [Ecotrin] 81 mg PO DAILY 06/17/16 03/20/17


 


Atorvastatin [Lipitor]  05/19/17 


 


Clopidogrel [Plavix]  05/19/17 


 


amLODIPine [Norvasc]  05/19/17 05/19/17














Review of Systems





- Physician Review


All systems were reviewed & negative as marked: Yes





- Review of Systems


Constitutional: absent: Fevers, Night Sweats


Eyes: absent: Vision Changes


ENT: absent: Hearing Changes


Respiratory: SOB.  absent: Cough


Cardiovascular: Chest Pain, Palpitations


Gastrointestinal: absent: Abdominal Pain


Genitourinary Male: absent: Urinary Output Changes


Musculoskeletal: absent: Back Pain, Neck Pain


Skin: absent: Rash, Pruritis


Neurological: Other (Lightheadedness)


Endocrine: absent: Diaphoresis


Hemo/Lymphatic: absent: Easy Bleeding


Psychiatric: absent: Depression





Physical Exam





- Physical Exam


Narrative Physical Exam (Text): 


Constitutional: No acute distress.


Head: Normocephalic.  Atraumatic.  


Eyes:  PERRL.


ENT:  Moist mucous membranes.


Neck:  Supple.


Cardiovascular:  Tachycardic. Irregular rhythm.


Chest: No tenderness.


Respiratory:  Clear to auscultation bilaterally.


GI:  Soft. Nontender. Nondistended. 


Back:  No CVA tenderness.


Musculoskeletal:  No tenderness or swelling of extremities.


Skin:  No rash. 


Neurologic:  Alert, no focal deficit.





Vital Signs Reviewed: Yes


Vital Signs











  Temp Pulse Resp BP Pulse Ox


 


 05/19/17 10:15   95 H  16  109/67  97


 


 05/19/17 08:19  98 F  138 H  18  140/87  96











Temperature: Afebrile


Blood Pressure: Normal


Pulse: Tachycardic


Respiratory Rate: Normal


Appearance: Positive for: Well-Appearing, Non-Toxic, Comfortable


Pain Distress: None


Mental Status: Positive for: Alert and Oriented X 3


Finger Stick Blood Glucose: 175





Medical Decision Making


ED Course and Treatment: 





05/19/17 08:20





Impression:





61 year old male complaining of intermittent midsternal chest pain with 

associated palpitations, lightheadedness since last night and shortness of 

breath this morning.





Plan:


-- EKG


-- Chest X-ray


-- Labs


-- Ecotrin, Lopressor, Toprol


-- Reassess and disposition





Prior Visits:


Notes and results from previous visits were reviewed. Patient last seen in ED 

on 03/20/17 for bradycardia that day. Patient was admitted to hospitalist care 

for further evaluation. 





Progress Notes:





EKG:


Ordered, reviewed, and independently interpreted the EKG.


Rate :  123 BPM


Rhythm : A Fib


Interpretation : Non-specific ST-T wave changes. No ST elevations. 





05/19/17 09:10


Chest X-ray:


Creator : Brett Mercado MD


FINDINGS:


LUNGS:No active pulmonary disease.


PLEURA:No significant pleural effusion identified, no pneumothorax apparent.


CARDIOVASCULAR:Normal.


OSSEOUS STRUCTURES:No significant abnormalities.


VISUALIZED UPPER ABDOMEN:Normal.


OTHER FINDINGS:None.


IMPRESSION:


No active disease.





Patient was treated with metoprolol IVP 5mg with response of rate to 100-110. 

Followed with metoprolol 25mg PO. Patient states symptoms much improved. Dr. Cortés saw patient on his previous admission and accepts patient for consult for 

this admission, saw patient at bedside. Dr. Austin accepts patient to 

hospitalist service.





- Critical Care


Critical Care Minutes: 45 minutes





- Lab Interpretations


Lab Results: 








 05/19/17 08:20 





 05/19/17 08:20 





 Lab Results





05/19/17 08:20: Digoxin < 0.4 L


05/19/17 08:20: Sodium 140, Potassium 4.7, Chloride 102, Carbon Dioxide 23, 

Anion Gap 20, BUN 20, Creatinine 0.7, Est GFR (African Amer) > 60, Est GFR (Non-

Af Amer) > 60, Random Glucose 157 H, Calcium 9.8, Total Bilirubin 0.6, AST 23, 

ALT 32, Alkaline Phosphatase 56, Total Creatine Kinase 138, Troponin I < 0.01, 

NT-Pro-B Natriuret Pep 498 H, Total Protein 7.0, Albumin 4.1, Globulin 2.9, 

Albumin/Globulin Ratio 1.4


05/19/17 08:20: PT 10.7, INR 0.99, APTT 26.7


05/19/17 08:20: WBC 7.1, RBC 5.62, Hgb 14.0, Hct 43.6, MCV 77.6 L, MCH 24.9 L, 

MCHC 32.1, RDW 25.5 H, Plt Count 306, MPV 9.8, Gran % 62.3, Lymph % (Auto) 26.3

, Mono % (Auto) 7.6 H, Eos % (Auto) 3.7, Baso % (Auto) 0.1, Gran # 4.39, Lymph 

# 1.9, Mono # 0.5, Eos # 0.3, Baso # 0.01








I have reviewed the lab results: Yes





- RAD Interpretation


Radiology Orders: 








05/19/17 08:32


CHEST PORTABLE [RAD] Stat 














- Medication Orders


Current Medication Orders: 








Acetaminophen (Tylenol 325mg Tab)  650 mg PO Q6 PRN


   PRN Reason: Fever >100.4 F


Atorvastatin Calcium (Lipitor)  20 mg PO HS KAROLYN


Clopidogrel Bisulfate (Plavix)  75 mg PO DAILY KAROLYN


Enoxaparin Sodium (Lovenox)  30 mg SC DAILY KAROLYN


   PRN Reason: Protocol


Famotidine (Pepcid)  20 mg PO DAILY KAROLYN





Discontinued Medications





Aspirin (Ecotrin)  243 mg PO STAT STA


   Stop: 05/19/17 08:32


   Last Admin: 05/19/17 09:01  Dose: 243 mg





Aspirin (Ecotrin) Confirm Administered Dose 243 mg PO .ST-MED ONE


   Stop: 05/19/17 08:56


   Last Admin: 05/19/17 10:53  Dose:  





Enoxaparin Sodium (Lovenox)  70 mg SC STAT STA


   PRN Reason: Protocol


   Stop: 05/19/17 09:51


   Last Admin: 05/19/17 10:23  Dose: 70 mg





Metoprolol Succinate (Toprol Xl)  25 mg PO STAT STA


   Stop: 05/19/17 09:06


   Last Admin: 05/19/17 09:31  Dose: 25 mg





Metoprolol Tartrate (Lopressor)  5 mg IVP STAT STA


   Stop: 05/19/17 08:32


   Last Admin: 05/19/17 09:02  Dose: 5 mg





Metoprolol Tartrate (Lopressor) Confirm Administered Dose 5 mg IVP .STK-MED ONE


   Stop: 05/19/17 08:56


   Last Admin: 05/19/17 10:53  Dose:  











- Scribe Statement


The provider has reviewed the documentation as recorded by the Scribe


Luiza Cortés





Provider Scribe Attestation:


All medical record entries made by the Scribe were at my direction and 

personally dictated by me. I have reviewed the chart and agree that the record 

accurately reflects my personal performance of the history, physical exam, 

medical decision making, and the department course for this patient. I have 

also personally directed, reviewed, and agree with the discharge instructions 

and disposition.





Disposition/Present on Arrival





- Present on Arrival


Any Indicators Present on Arrival: Yes


History of DVT/PE: No


History of Uncontrolled Diabetes: Yes


Urinary Catheter: No


History of Decub. Ulcer: No


History Surgical Site Infection Following: None





- Disposition


Have Diagnosis and Disposition been Completed?: Yes


Diagnosis: 


 Rapid atrial fibrillation, Chest pain





Disposition: HOSPITALIZED


Disposition Time: 09:38


Patient Plan: Admission, Telemetry


Condition: GUARDED

## 2017-05-19 NOTE — CP.PCM.HP
<Christiana Calles - Last Filed: 05/19/17 12:53>





History of Present Illness





- History of Present Illness


History of Present Illness: 





CC: CHEST PAIN





61 year old male with past medical history of HTN, hypercholestrolemia, CAD, HCV

, DM and asthma presents for CP that began last night at 21:00.  Patient's 

daughter-in-law is at bedside and helps translate.  Patient states that chest 

pain is intermittent and feels like a pressure.  Pain is associated with SOB 

and lightheadedness and palpitations. Pain is not associated with certain 

positions.  Patient took aspirin for the pain.  In ED, patient was found to 

have new onset A fib seen on EKG.  Patient was recently discharged from 

hospital on 3/23/17 after being admitted for symptomatic bradycardia likely due 

to Lopressor medication. Lopressor was stopped for the patient.  He states that 

he has followed up with Dr. Cortés for outpatient stress test but he does not 

know results of test.


Patient denies having any recent illnesses.  Currently, he denies having any CP

, SOB, abd pain, N/V/D/C, f/c.  





PMHx: stated above


Sx: PSHx: R Inguinal Hernia repair, Colonoscopy w/ polypectomy


Family Hx: Mother - CAD


Social Hx: Current 4cigs/day smoker. No ETOH. No Drugs. Works as a 


[employer]


Meds: Norvasc, Plavix, Lipitor, Lisinopril 20 mg po qd, Metformin 1000mg PO BID

, Aspirin 81mg PO Daily


 





Present on Admission





- Present on Admission


Any Indicators Present on Admission: No





Review of Systems





- Review of Systems


All systems: reviewed and no additional remarkable complaints except





Past Patient History





- Infectious Disease


Hx of Infectious Diseases: None





- Tetanus Immunizations


Tetanus Immunization: Unknown





- Past Social History


Smoking Status: Current Some Days Smoker


Chewing Tobacco Use: No


Cigar Use: No


Alcohol: None


Drugs: Denies


Home Situation {Lives}: With Family





- CARDIAC


Hx Cardiac Disorders: Yes


Hx Hypertension: Yes





- PULMONARY


Hx Respiratory Disorders: Yes


Hx Asthma: Yes


Hx Chronic Obstructive Pulmonary Disease (COPD): Yes





- NEUROLOGICAL


Hx Neurological Disorder: No





- HEENT


Hx HEENT Problems: Yes


Hx Deafness: Yes (right ear [place])





- RENAL


Hx Chronic Kidney Disease: No





- ENDOCRINE/METABOLIC


Hx Endocrine Disorders: Yes


Hx Diabetes Mellitus Type 2: Yes (iddm)





- HEMATOLOGICAL/ONCOLOGICAL


Hx Blood Disorders: No





- INTEGUMENTARY


Hx Dermatological Problems: No





- MUSCULOSKELETAL/RHEUMATOLOGICAL


Hx Arthritis: Yes


Hx Falls: No





- GASTROINTESTINAL


Hx Gastrointestinal Disorders: No





- GENITOURINARY/GYNECOLOGICAL


Hx Genitourinary Disorders: No





- PSYCHIATRIC


Hx Psychophysiologic Disorder: No


Hx Substance Use: No





- SURGICAL HISTORY


Hx Cardiac Catheterization: Yes (negative 6/2016)


Other/Comment: colonoscopy/endoscopy





- ANESTHESIA


Hx Anesthesia: Yes


Hx Anesthesia Reactions: No


Hx Malignant Hyperthermia: No





Meds


Allergies/Adverse Reactions: 


 Allergies











Allergy/AdvReac Type Severity Reaction Status Date / Time


 


No Known Allergies Allergy   Verified 05/19/17 08:14














Physical Exam





- Constitutional


Appears: Non-toxic, No Acute Distress





- Head Exam


Head Exam: ATRAUMATIC





- Eye Exam


Eye Exam: EOMI





- ENT Exam


ENT Exam: Mucous Membranes Moist





- Respiratory Exam


Respiratory Exam: Clear to Auscultation Bilateral, NORMAL BREATHING PATTERN.  

absent: Accessory Muscle Use, Rales, Rhonchi, Wheezes





- Cardiovascular Exam


Cardiovascular Exam: REGULAR RHYTHM, +S1, +S2.  absent: Diastolic murmur, Gallop

, Rubs, Systolic Murmur





- GI/Abdominal Exam


GI & Abdominal Exam: Normal Bowel Sounds, Soft.  absent: Distended, Firm, 

Guarding, Rigid, Tenderness





- Extremities Exam


Extremities exam: Negative for: calf tenderness, pedal edema, tenderness





- Neurological Exam


Neurological exam: Alert, Oriented x3





- Psychiatric Exam


Psychiatric exam: Normal Affect, Normal Mood





- Skin


Skin Exam: Dry, Intact, Normal Color, Warm





Results





- Vital Signs


Recent Vital Signs: 





 Last Vital Signs











Temp  98 F   05/19/17 08:19


 


Pulse  95 H  05/19/17 10:15


 


Resp  16   05/19/17 10:15


 


BP  109/67   05/19/17 10:15


 


Pulse Ox  97   05/19/17 10:15














- Labs


Result Diagrams: 


 05/19/17 08:20





 05/19/17 08:20





- EKG Data


EKG Interpreted by: ER Physician





Assessment & Plan





- Assessment and Plan (Free Text)


Assessment: 


61 year old male with past medical history of HTN, hypercholestrolemia, CAD< HCV

, DM and asthma is admitted for CP r/o ACS and new onset a fib.  EKG in ED 

showed HR of 123 and a fib with no ST changes.  Initial trops are negative.  

ProBNP was 498.  CXR was negative.





1. New onset a fib


- CHADVAsc score of 2.  HAS BLED score of 2 (4% risk of bleeding). 


- Will consult cardio.  Per cardio, avoid beta blockers in patient due to 

symptomatic bradycardia on last visit.  Patient is scheduled for outpatient 

stress test next week.  Will await further recs about anticoagulation for pt


- Patient received Lopressor 5 mg IVP and toprol 25 mg po in ED


- Will check TSH and free T4


- Will start patient on CCB


- Patient received lovenox 70 mg in ED.  


-Will start patient on Lovenox 40 mg SC BID


-Will continue home medications: lisinopril 20 mg po qd, and norvasc 5 mg po 

qd.  Will also add cardizem 30 mg po q8 prn





2. CP R/O ACS with history of CAD


- Will do serial trops and EKG


- Pt had cath procedure done on 6/2016 which showed non-obstructive coronary 

artery disease


- echo done on 3/2017 showed normal EF, normal LV function and size.  Grade 1 

abnormal relaxation pattern


- Will continue aspirin and plavix daily 





3. DM


- will hold home metformin


- ISS medium dose


- Will check Hgba1c and lipid panel





4. Hypercholestrolemia


- will check lipid panel


- continue home lipitor 20 mg po qd





5. HCV


- On previous admission, hepatitis panel showed positive hep c. Pt was given 

information about following up with GI specialist at [place].  Patient never 

followed up.  Will give repeat info on this admission.  





6. Tobacco abuse


- Discussed risks or smoking and recommended cessation


- Nicotin patch 





Prophylaxis


- Pepcid


- Lovenox 





Case discussed with attending, Dr. Austin 


 





- Date & Time


Date: 05/19/17


Time: 10:56





<Boris Austin - Last Filed: 05/20/17 15:01>





Results





- Vital Signs


Recent Vital Signs: 





 Last Vital Signs











Temp  98.1 F   05/20/17 12:00


 


Pulse  63   05/20/17 12:00


 


Resp  19   05/20/17 12:00


 


BP  132/79   05/20/17 12:00


 


Pulse Ox  99   05/20/17 00:00














- Labs


Result Diagrams: 


 05/20/17 08:44





 05/20/17 08:44


Labs: 





 Laboratory Results - last 24 hr











  05/19/17 05/19/17 05/20/17





  16:55 16:55 00:15


 


WBC   7.2 


 


RBC   5.36 


 


Hgb   13.5 L 


 


Hct   41.6 L 


 


MCV   77.6 L 


 


MCH   25.2 


 


MCHC   32.5 


 


RDW   24.9 H 


 


Plt Count   272 


 


MPV   9.3 


 


Gran %   


 


Lymph % (Auto)   


 


Mono % (Auto)   


 


Eos % (Auto)   


 


Baso % (Auto)   


 


Gran #   


 


Lymph #   


 


Mono #   


 


Eos #   


 


Baso #   


 


PT   


 


INR   


 


APTT   


 


Sodium   


 


Potassium   


 


Chloride   


 


Carbon Dioxide   


 


Anion Gap   


 


BUN   


 


Creatinine   


 


Est GFR ( Amer)   


 


Est GFR (Non-Af Amer)   


 


Random Glucose   


 


Calcium   


 


Troponin I  < 0.01   < 0.01


 


Triglycerides   


 


Cholesterol   


 


LDL Cholesterol Direct   


 


HDL Cholesterol   


 


Thyroxine (T4)   


 


TSH 3rd Generation   














  05/20/17 05/20/17 05/20/17





  08:44 08:44 08:44


 


WBC  7.8  


 


RBC  5.96  


 


Hgb  15.0  


 


Hct  46.2  


 


MCV  77.5 L  


 


MCH  25.2  


 


MCHC  32.5  


 


RDW  25.0 H  


 


Plt Count  308  


 


MPV  9.6  


 


Gran %  59.1  


 


Lymph % (Auto)  30.7  


 


Mono % (Auto)  6.6 H  


 


Eos % (Auto)  3.3  


 


Baso % (Auto)  0.3  


 


Gran #  4.63  


 


Lymph #  2.4  


 


Mono #  0.5  


 


Eos #  0.3  


 


Baso #  0.02  


 


PT   10.7 


 


INR   0.99 


 


APTT   26.8 


 


Sodium    138


 


Potassium    5.2 H


 


Chloride    101


 


Carbon Dioxide    29


 


Anion Gap    13


 


BUN    19


 


Creatinine    0.7


 


Est GFR ( Amer)    > 60


 


Est GFR (Non-Af Amer)    > 60


 


Random Glucose    167 H


 


Calcium    9.5


 


Troponin I   


 


Triglycerides    271 H


 


Cholesterol    171


 


LDL Cholesterol Direct    100


 


HDL Cholesterol    42


 


Thyroxine (T4)   


 


TSH 3rd Generation   














  05/20/17





  08:44


 


WBC 


 


RBC 


 


Hgb 


 


Hct 


 


MCV 


 


MCH 


 


MCHC 


 


RDW 


 


Plt Count 


 


MPV 


 


Gran % 


 


Lymph % (Auto) 


 


Mono % (Auto) 


 


Eos % (Auto) 


 


Baso % (Auto) 


 


Gran # 


 


Lymph # 


 


Mono # 


 


Eos # 


 


Baso # 


 


PT 


 


INR 


 


APTT 


 


Sodium 


 


Potassium 


 


Chloride 


 


Carbon Dioxide 


 


Anion Gap 


 


BUN 


 


Creatinine 


 


Est GFR ( Amer) 


 


Est GFR (Non-Af Amer) 


 


Random Glucose 


 


Calcium 


 


Troponin I 


 


Triglycerides 


 


Cholesterol 


 


LDL Cholesterol Direct 


 


HDL Cholesterol 


 


Thyroxine (T4)  9.7


 


TSH 3rd Generation  0.65














Attending/Attestation





- Attestation


I have personally seen and examined this patient.: Yes


I have fully participated in the care of the patient.: Yes


I have reviewed all pertinent clinical information: Yes


Notes (Text): 





05/20/17 14:54








Attending note;





Patient seen and examined with resident in ER.





Patient is a 61 year old  male with past medical history of HTN, 

hypercholestrolemia, CAD, HCV, DM , anemia and asthma is admitted with chest 

pain.


Patient was found to be in new onset A. fib. Treated with IV metoprolol.


Started on  po Cardizem. Patient with a history of bradycardia in the past. 

Monitor heart rate closely.


Admit the patient to telemetry and monitored closely.


Cardiology evaluation with Dr. Cortés requested.


History of anemia and blood transfusion in the past. Currently hemoglobin is 

stable.


Started on aspirin, Lovenox.





Diabetes/hypertension and hypercholesterolemia; continue home medication.





The diagnosis, follow-up plan discussed with patient in detail.





Upon discharge the patient will follow-up with PMD Dr. Morrison.

## 2017-05-20 LAB
ADD MANUAL DIFF?: NO
APTT BLD: 26.8 SECONDS (ref 23.7–30.8)
BASOPHILS # BLD AUTO: 0.02 K/MM3 (ref 0–2)
BASOPHILS NFR BLD: 0.3 % (ref 0–3)
BUN SERPL-MCNC: 19 MG/DL (ref 7–21)
CALCIUM SERPL-MCNC: 9.5 MG/DL (ref 8.4–10.5)
CHLORIDE SERPL-SCNC: 101 MMOL/L (ref 98–107)
CHOLEST SERPL-MCNC: 171 MG/DL (ref 130–200)
CO2 SERPL-SCNC: 29 MMOL/L (ref 21–33)
EOSINOPHIL # BLD: 0.3 10*3/UL (ref 0–0.7)
EOSINOPHIL NFR BLD: 3.3 % (ref 1.5–5)
ERYTHROCYTE [DISTWIDTH] IN BLOOD BY AUTOMATED COUNT: 25 % (ref 11.5–14.5)
GLUCOSE SERPL-MCNC: 167 MG/DL (ref 70–110)
GRANULOCYTES # BLD: 4.63 10*3/UL (ref 1.4–6.5)
GRANULOCYTES NFR BLD: 59.1 % (ref 50–68)
HCT VFR BLD CALC: 46.2 % (ref 42–52)
INR PPP: 0.99 (ref 0.93–1.08)
LYMPHOCYTES # BLD: 2.4 10*3/UL (ref 1.2–3.4)
LYMPHOCYTES NFR BLD AUTO: 30.7 % (ref 22–35)
MCH RBC QN AUTO: 25.2 PG (ref 25–35)
MCHC RBC AUTO-ENTMCNC: 32.5 G/DL (ref 31–37)
MCV RBC AUTO: 77.5 FL (ref 80–105)
MONOCYTES # BLD AUTO: 0.5 10*3/UL (ref 0.1–0.6)
MONOCYTES NFR BLD: 6.6 % (ref 1–6)
PLATELET # BLD: 308 10^3/UL (ref 120–450)
PMV BLD AUTO: 9.6 FL (ref 7–11)
POTASSIUM SERPL-SCNC: 5.2 MMOL/L (ref 3.6–5)
SODIUM SERPL-SCNC: 138 MMOL/L (ref 132–148)
T4 SERPL-MCNC: 9.7 UG/DL (ref 5.5–11)
TSH SERPL-ACNC: 0.65 MIU/ML (ref 0.46–4.68)
WBC # BLD AUTO: 7.8 10^3/UL (ref 4.5–11)

## 2017-05-20 RX ADMIN — INSULIN LISPRO SCH: 100 INJECTION, SUSPENSION SUBCUTANEOUS at 18:24

## 2017-05-20 RX ADMIN — ENOXAPARIN SODIUM SCH MG: 80 INJECTION SUBCUTANEOUS at 09:11

## 2017-05-20 RX ADMIN — ENOXAPARIN SODIUM SCH MG: 80 INJECTION SUBCUTANEOUS at 17:11

## 2017-05-20 RX ADMIN — INSULIN HUMAN SCH: 100 INJECTION, SOLUTION PARENTERAL at 21:54

## 2017-05-20 RX ADMIN — INSULIN HUMAN SCH UNITS: 100 INJECTION, SOLUTION PARENTERAL at 17:12

## 2017-05-20 RX ADMIN — INSULIN HUMAN SCH: 100 INJECTION, SOLUTION PARENTERAL at 08:21

## 2017-05-20 RX ADMIN — INSULIN HUMAN SCH: 100 INJECTION, SOLUTION PARENTERAL at 12:22

## 2017-05-20 NOTE — CARD
--------------- APPROVED REPORT --------------





EKG Measurement

Heart [id number]

[id number]

[id number]

[id number]



<Conclusion>

Atrial fibrillation with rapid ventricular response

Right bundle branch block

Abnormal ECG

## 2017-05-20 NOTE — PN
DATE: 05/20/2017



The patient is asymptomatic.  No shortness of breath, no chest pain.



PHYSICAL EXAMINATION:

VITAL SIGNS:  Blood pressure is 148/87 and the heart rate varies between the 50s through the 90s.

NECK:  Negative JVD.

LUNGS:  Without rales.

HEART:  Revealed S1, S2.

EXTREMITIES:  Without edema.



LABORATORIES:  Hemoglobin is 13.5.  Chemistries:  Troponins are all negative.



IMPRESSION:

1.  Atypical chest pain.

2.  Atrial fibrillation.

3.  No evidence for acute coronary syndrome.

4.  Chronic obstructive pulmonary disease.

5.  Diabetes mellitus.



PLAN:  Given these findings, the patient is currently on aspirin as well as clopidogrel.  We will amanda
nge his medication to long acting Cardizem.  We will continue on aspirin and Plavix for now.  An outp
atient cardiac workup has been ordered on the patient.





__________________________________________

Lance Cortés MD







cc:



DD: 05/20/2017 08:00:41  [id number]

TT: 05/20/2017 09:03:18

Confirmation # [id number]

Dictation # [id number]

jn

## 2017-05-20 NOTE — CP.PCM.PN
<Ryann Nino - Last Filed: 05/20/17 17:19>





Subjective





- Date & Time of Evaluation


Date of Evaluation: 05/20/17


Time of Evaluation: 11:00





- Subjective


Subjective: 





PGY-1 Medicine progress note. 





Patient seen and examined at bedside on telemetry, No acute distress. Nurse 

reports no acute events overnight. Patient continues to be in a. fib, with 

elevated heart rate. Patient continues to have some pressure like chest pain. 

trops are negative, EKG shows a. fib. Has no other complaints. Tolerating diet. 





Objective





- Vital Signs/Intake and Output


Vital Signs (last 24 hours): 


 











Temp Pulse Resp BP Pulse Ox


 


 98.1 F   138 H  22   148/87   99 


 


 05/20/17 00:00  05/20/17 11:39  05/20/17 00:00  05/20/17 09:10  05/20/17 00:00








Intake and Output: 


 











 05/20/17 05/20/17





 06:59 18:59


 


Intake Total 720 


 


Balance 720 














- Medications


Medications: 


 Current Medications





Acetaminophen (Tylenol 325mg Tab)  650 mg PO Q6 PRN


   PRN Reason: Fever >100.4 F


   Last Admin: 05/19/17 22:34 Dose:  650 mg


Amlodipine Besylate (Norvasc)  5 mg PO DAILY [facility]


   Last Admin: 05/19/17 14:00 Dose:  Not Given


Aspirin (Ecotrin)  81 mg PO DAILY [facility]


   Last Admin: 05/20/17 09:11 Dose:  81 mg


Atorvastatin Calcium (Lipitor)  20 mg PO HS [facility]


   Last Admin: 05/19/17 22:07 Dose:  20 mg


Enoxaparin Sodium (Lovenox)  70 mg SC BID [facility]


   PRN Reason: Protocol


   Last Admin: 05/20/17 09:11 Dose:  70 mg


Famotidine (Pepcid)  20 mg PO DAILY [facility]


   Last Admin: 05/20/17 09:10 Dose:  20 mg


Insulin Human Regular (Humulin R Med)  0 units SC ACHS [facility]


   PRN Reason: Protocol


   Last Admin: 05/20/17 12:22 Dose:  Not Given


Lisinopril (Zestril)  10 mg PO DAILY [facility]


   Last Admin: 05/20/17 09:10 Dose:  10 mg


Nicotine (Nicoderm Cq)  1 patch TD DAILY [facility]


   Last Admin: 05/20/17 09:11 Dose:  1 patch


Sotalol HCl (Betapace)  80 mg PO BID [facility]


   Last Admin: 05/20/17 09:10 Dose:  80 mg











- Labs


Labs: 


 





 05/20/17 08:44 





 05/20/17 08:44 





 











PT  10.7 Seconds (9.9-11.8)   05/20/17  08:44    


 


INR  0.99  (0.93-1.08)   05/20/17  08:44    


 


APTT  26.8 Seconds (23.7-30.8)   05/20/17  08:44    














- Constitutional


Appears: Well, No Acute Distress





- Head Exam


Head Exam: ATRAUMATIC, NORMOCEPHALIC





- Eye Exam


Eye Exam: Normal appearance





- ENT Exam


ENT Exam: Mucous Membranes Moist





- Respiratory Exam


Respiratory Exam: Clear to Ausculation Bilateral, NORMAL BREATHING PATTERN.  

absent: Decreased Breath Sounds, Rhonchi, Wheezes, Respiratory Distress





- Cardiovascular Exam


Cardiovascular Exam: Tachycardia, Irregular Rhythm (a. fib ).  absent: Murmur





- GI/Abdominal Exam


GI & Abdominal Exam: Soft, Normal Bowel Sounds.  absent: Firm, Guarding, 

Tenderness





- Extremities Exam


Extremities Exam: Normal Inspection





- Neurological Exam


Neurological Exam: Alert, Awake, Oriented x3





- Skin


Skin Exam: Dry, Intact, Normal Color, Warm





Assessment and Plan





- Assessment and Plan (Free Text)


Assessment: 





61 year old male with past medical history of HTN, hypercholestrolemia, CAD< HCV

, DM and asthma is admitted for CP r/o ACS and new onset a fib.  EKG in ED 

showed HR of 123 and a fib with no ST changes.  Trops are negative x3.  ProBNP 

was 498.  CXR was negative.





Plan: 


1. New onset a fib


- CHADVAsc score of 2.  HAS BLED score of 2 (4% risk of bleeding). 


- cardio following 


- Per cardio, avoid beta blockers in patient due to symptomatic bradycardia on 

last visit.


- Patient is scheduled for outpatient stress test next week.


- currently on lovenox 60 sc BID for anticoagulation 


- Patient received Lopressor 5 mg IVP and toprol 25 mg po in ED


- TSH and free T4 are wnl


- sotalol started per cardiology


- Patient received lovenox 70 mg in ED.  


- continue home medications: lisinopril 20 mg po qd, and norvasc 5 mg po qd. 


- pt continued to have tachycardia, cardizem 30mg was given once


- will continue to monitor





2. CP R/O ACS with history of CAD


- serial trops negatove x3 and EKG showed a. fib with rbbb, st changes


- Pt had cath procedure done on 6/2016 which showed non-obstructive coronary 

artery disease


- echo done on 3/2017 showed normal EF, normal LV function and size.  Grade 1 

abnormal relaxation pattern


- Will continue aspirin daily 





3. DM


- will hold home metformin


- ISS medium dose


- Will check HgbA1c 


- triglycerides elevated, cholesterol wnl 





4. Hypercholestrolemia


- triglycerides elevated, cholesterol wnl 


- continue home lipitor 20 mg po qd





5. HCV


- On previous admission, hepatitis panel showed positive hep c. Pt was given 

information about following up with GI specialist at [place].  Patient never 

followed up.  Will give repeat info on this admission.  





6. Tobacco abuse


- Discussed risks or smoking and recommended cessation


- Nicotin patch 





Prophylaxis


- Pepcid


- Lovenox 











<Boris Austin - Last Filed: 05/20/17 17:33>





Objective





- Vital Signs/Intake and Output


Vital Signs (last 24 hours): 


 











Temp Pulse Resp BP Pulse Ox


 


 98.1 F   140 H  19   105/48 L  99 


 


 05/20/17 12:00  05/20/17 17:12  05/20/17 12:00  05/20/17 17:12  05/20/17 00:00








Intake and Output: 


 











 05/20/17 05/20/17





 06:59 18:59


 


Intake Total 720 


 


Balance 720 














- Medications


Medications: 


 Current Medications





Acetaminophen (Tylenol 325mg Tab)  650 mg PO Q6 PRN


   PRN Reason: Fever >100.4 F


   Last Admin: 05/19/17 22:34 Dose:  650 mg


Amlodipine Besylate (Norvasc)  5 mg PO DAILY [facility]


   Last Admin: 05/19/17 14:00 Dose:  Not Given


Aspirin (Ecotrin)  81 mg PO DAILY [facility]


   Last Admin: 05/20/17 09:11 Dose:  81 mg


Atorvastatin Calcium (Lipitor)  20 mg PO HS [facility]


   Last Admin: 05/19/17 22:07 Dose:  20 mg


Enoxaparin Sodium (Lovenox)  70 mg SC BID [facility]


   PRN Reason: Protocol


   Last Admin: 05/20/17 17:11 Dose:  70 mg


Famotidine (Pepcid)  20 mg PO DAILY [facility]


   Last Admin: 05/20/17 09:10 Dose:  20 mg


Ferrous Sulfate (Feosol)  324 mg PO TID [facility]


   Last Admin: 05/20/17 17:12 Dose:  324 mg


Insulin Human Regular (Humulin R Med)  0 units SC ACHS [facility]


   PRN Reason: Protocol


   Last Admin: 05/20/17 17:12 Dose:  1 units


Insulin Lispro Protam/Lispro Human (Humalog Mix 75/25)  10 units SC BID [facility]


Lisinopril (Zestril)  10 mg PO DAILY [facility]


   Last Admin: 05/20/17 09:10 Dose:  10 mg


Nicotine (Nicoderm Cq)  1 patch TD DAILY [facility]


   Last Admin: 05/20/17 09:11 Dose:  1 patch


Pantoprazole Sodium (Protonix Ec Tab)  40 mg PO 0630 [facility]


Sotalol HCl (Betapace)  80 mg PO BID [facility]


   Last Admin: 05/20/17 17:12 Dose:  80 mg











- Labs


Labs: 


 





 05/20/17 08:44 





 05/20/17 08:44 





 











PT  10.7 Seconds (9.9-11.8)   05/20/17  08:44    


 


INR  0.99  (0.93-1.08)   05/20/17  08:44    


 


APTT  26.8 Seconds (23.7-30.8)   05/20/17  08:44    














Attending/Attestation





- Attestation


I have personally seen and examined this patient.: Yes


I have fully participated in the care of the patient.: Yes


I have reviewed all pertinent clinical information, including history, physical 

exam and plan: Yes


Notes (Text): 





05/20/17 17:30








Attending note;





Patient seen and examined with resident in ER.





Patient is a 61 year old  male with past medical history of HTN, 

hypercholestrolemia, CAD, HCV, DM , anemia and asthma is admitted with chest 

pain.


Patient was found to be in new onset A. fib. Treated with IV metoprolol.





 Patient with a history of bradycardia in the past. Evaluated by cardiology Dr. Cortés today. needs outpatient stress test.


Started on sotalol.  still with elevated heart rate.





History of anemia and blood transfusion in the past. Currently hemoglobin is 

stable.


Started on aspirin, Lovenox.


patient had cardiac cath in 6/2016 showed normal ejection fraction and 

nonobstructive  coronary disease.





Active smoking; smoking cessation is strongly advised. On NicoDerm patch.





Diabetes/hypertension and hypercholesterolemia; continue home medication.





Upon discharge the patient will follow-up with PMD Dr. Morrison.

## 2017-05-21 LAB
ADD MANUAL DIFF?: NO
APTT BLD: 25.9 SECONDS (ref 23.7–30.8)
BASOPHILS # BLD AUTO: 0.01 K/MM3 (ref 0–2)
BASOPHILS NFR BLD: 0.1 % (ref 0–3)
EOSINOPHIL # BLD: 0.2 10*3/UL (ref 0–0.7)
EOSINOPHIL NFR BLD: 3 % (ref 1.5–5)
ERYTHROCYTE [DISTWIDTH] IN BLOOD BY AUTOMATED COUNT: 24.7 % (ref 11.5–14.5)
GRANULOCYTES # BLD: 4.28 10*3/UL (ref 1.4–6.5)
GRANULOCYTES NFR BLD: 57.5 % (ref 50–68)
HCT VFR BLD CALC: 47.2 % (ref 42–52)
INR PPP: 0.99 (ref 0.93–1.08)
LYMPHOCYTES # BLD: 2.4 10*3/UL (ref 1.2–3.4)
LYMPHOCYTES NFR BLD AUTO: 32.8 % (ref 22–35)
MCH RBC QN AUTO: 25.1 PG (ref 25–35)
MCHC RBC AUTO-ENTMCNC: 32.4 G/DL (ref 31–37)
MCV RBC AUTO: 77.4 FL (ref 80–105)
MONOCYTES # BLD AUTO: 0.5 10*3/UL (ref 0.1–0.6)
MONOCYTES NFR BLD: 6.6 % (ref 1–6)
PLATELET # BLD: 293 10^3/UL (ref 120–450)
PMV BLD AUTO: 9.8 FL (ref 7–11)
WBC # BLD AUTO: 7.4 10^3/UL (ref 4.5–11)

## 2017-05-21 RX ADMIN — INSULIN HUMAN SCH: 100 INJECTION, SOLUTION PARENTERAL at 07:48

## 2017-05-21 RX ADMIN — PANTOPRAZOLE SODIUM SCH MG: 40 TABLET, DELAYED RELEASE ORAL at 05:33

## 2017-05-21 RX ADMIN — ENOXAPARIN SODIUM SCH MG: 80 INJECTION SUBCUTANEOUS at 10:06

## 2017-05-21 RX ADMIN — INSULIN LISPRO SCH: 100 INJECTION, SUSPENSION SUBCUTANEOUS at 10:09

## 2017-05-21 RX ADMIN — INSULIN HUMAN SCH UNITS: 100 INJECTION, SOLUTION PARENTERAL at 12:21

## 2017-05-21 RX ADMIN — INSULIN HUMAN SCH: 100 INJECTION, SOLUTION PARENTERAL at 21:52

## 2017-05-21 RX ADMIN — INSULIN HUMAN SCH: 100 INJECTION, SOLUTION PARENTERAL at 17:22

## 2017-05-21 RX ADMIN — ENOXAPARIN SODIUM SCH MG: 80 INJECTION SUBCUTANEOUS at 18:25

## 2017-05-21 NOTE — CP.PCM.PN
<Christiana Calles - Last Filed: 05/21/17 14:21>





Subjective





- Date & Time of Evaluation


Date of Evaluation: 05/21/17


Time of Evaluation: 10:44





- Subjective


Subjective: 





HOSPITALISTS PROGRESS NOTE





Pt is seen and examined at bedside.  No acute events overnight.  Patient's HR 

has been elevated throughout the night at around 120s and he continues to be in 

a fib.  Patient does state that he gets occasional palpitations but denies 

having any CP, SOB, abd pain, N/v/D/C.  Daughter is by bedside and helps 

translate.  





Objective





- Vital Signs/Intake and Output


Vital Signs (last 24 hours): 


 











Temp Pulse Resp BP Pulse Ox


 


 97.5 F L  112 H  20   137/74   100 


 


 05/21/17 06:00  05/21/17 10:05  05/21/17 06:00  05/21/17 10:05 05/21/17 06:00











- Medications


Medications: 


 Current Medications





Acetaminophen (Tylenol 325mg Tab)  650 mg PO Q6 PRN


   PRN Reason: Fever >100.4 F


   Last Admin: 05/19/17 22:34 Dose:  650 mg


Amlodipine Besylate (Norvasc)  5 mg PO DAILY [facility]


   Last Admin: 05/21/17 10:05 Dose:  5 mg


Aspirin (Ecotrin)  81 mg PO DAILY [facility]


   Last Admin: 05/21/17 10:06 Dose:  81 mg


Atorvastatin Calcium (Lipitor)  20 mg PO HS [facility]


   Last Admin: 05/20/17 21:58 Dose:  20 mg


Enoxaparin Sodium (Lovenox)  70 mg SC BID [facility]


   PRN Reason: Protocol


   Last Admin: 05/21/17 10:06 Dose:  70 mg


Famotidine (Pepcid)  20 mg PO DAILY [facility]


   Last Admin: 05/21/17 10:05 Dose:  20 mg


Ferrous Sulfate (Feosol)  324 mg PO TID [facility]


   Last Admin: 05/21/17 10:05 Dose:  324 mg


Insulin Human Regular (Humulin R Med)  0 units SC ACHS [facility]


   PRN Reason: Protocol


   Last Admin: 05/21/17 07:48 Dose:  Not Given


Lisinopril (Zestril)  10 mg PO DAILY [facility]


   Last Admin: 05/20/17 09:10 Dose:  10 mg


Metformin HCl (Glucophage)  1,000 mg PO BRKDIN [facility]


Nicotine (Nicoderm Cq)  1 patch TD DAILY [facility]


   Last Admin: 05/21/17 10:10 Dose:  1 patch


Pantoprazole Sodium (Protonix Ec Tab)  40 mg PO 0630 [facility]


   Last Admin: 05/21/17 05:33 Dose:  40 mg


Sotalol HCl (Betapace)  80 mg PO BID [facility]


   Last Admin: 05/21/17 10:05 Dose:  80 mg











- Labs


Labs: 


 





 05/21/17 07:30 





 05/20/17 08:44 





 











PT  10.7 Seconds (9.9-11.8)   05/21/17  07:30    


 


INR  0.99  (0.93-1.08)   05/21/17  07:30    


 


APTT  25.9 Seconds (23.7-30.8)   05/21/17  07:30    














- Constitutional


Appears: Non-toxic, No Acute Distress





- ENT Exam


ENT Exam: Mucous Membranes Moist





- Respiratory Exam


Respiratory Exam: Clear to Ausculation Bilateral, NORMAL BREATHING PATTERN.  

absent: Rales, Rhonchi, Wheezes





- Cardiovascular Exam


Cardiovascular Exam: Irregular Rhythm, +S1, +S2.  absent: Gallop, REGULAR RHYTHM

, Rubs, Murmur





- GI/Abdominal Exam


GI & Abdominal Exam: Soft, Normal Bowel Sounds.  absent: Distended, Firm, 

Guarding, Rigid, Tenderness





- Extremities Exam


Extremities Exam: absent: Pedal Edema, Tenderness





- Neurological Exam


Neurological Exam: Alert, Awake, Oriented x3





- Psychiatric Exam


Psychiatric exam: Normal Affect, Normal Mood





- Skin


Skin Exam: Dry, Intact, Normal Color, Warm





Assessment and Plan





- Assessment and Plan (Free Text)


Assessment: 





61 year old male with past medical history of HTN, hypercholestrolemia, CAD< HCV

, DM and asthma is admitted for CP r/o ACS and new onset a fib.  EKG in ED 

showed HR of 123 and a fib with no ST changes.  Trops are negative x3.  ProBNP 

was 498.  CXR was negative.





Plan: 


1. New onset a fib


- Repeat EKG today showed a fib 


- CHADVAsc score of 2.  HAS BLED score of 2 (4% risk of bleeding). 


- cardio following.  Awaiting further recs about anticoagulation and 

medications for HR control.   


- Per cardio, avoid beta blockers in patient due to symptomatic bradycardia on 

last visit.


- Patient is scheduled for outpatient stress test next week.


- currently on lovenox 70 sc BID for anticoagulation + aspirin qd 


- TSH and free T4 are wnl


- sotalol started per cardiology.  HR is uncontrolled currently  


- continue home medications: lisinopril 20 mg po qd, and norvasc 5 mg po qd. 


- pt continued to have tachycardia, cardizem 30mg was given once


- will continue to monitor





2. CP R/O ACS with history of CAD


- serial trops negatove x3 and EKG showed a. fib with rbbb, st changes


- Pt had cath procedure done on 6/2016 which showed non-obstructive coronary 

artery disease


- echo done on 3/2017 showed normal EF, normal LV function and size.  Grade 1 

abnormal relaxation pattern


- Will continue aspirin daily 





3. DM


- will hold home metformin


- ISS medium dose


- Will check HgbA1c 


- triglycerides elevated, cholesterol wnl 





4. Hypercholestrolemia


- triglycerides elevated, cholesterol wnl 


- continue home lipitor 20 mg po qd





5. HCV


- On previous admission, hepatitis panel showed positive hep c. Pt was given 

information about following up with GI specialist at [place].  Patient never 

followed up.  Will give repeat info on this admission.  





6. Tobacco abuse


- Discussed risks or smoking and recommended cessation


- Nicotin patch 





Prophylaxis


- Pepcid


- Lovenox 





Case discussed with attending, Dr. Austin 








<Boris Austin - Last Filed: 05/21/17 14:44>





Objective





- Vital Signs/Intake and Output


Vital Signs (last 24 hours): 


 











Temp Pulse Resp BP Pulse Ox


 


 97.6 F   60   19   132/73   100 


 


 05/21/17 11:35  05/21/17 11:35  05/21/17 11:35  05/21/17 11:35  05/21/17 06:00











- Medications


Medications: 


 Current Medications





Acetaminophen (Tylenol 325mg Tab)  650 mg PO Q6 PRN


   PRN Reason: Fever >100.4 F


   Last Admin: 05/19/17 22:34 Dose:  650 mg


Aspirin (Ecotrin)  81 mg PO DAILY [facility]


   Last Admin: 05/21/17 10:06 Dose:  81 mg


Atorvastatin Calcium (Lipitor)  20 mg PO HS [facility]


   Last Admin: 05/20/17 21:58 Dose:  20 mg


Diltiazem HCl (Cardizem)  30 mg PO QID [facility]


Enoxaparin Sodium (Lovenox)  70 mg SC BID [facility]


   PRN Reason: Protocol


   Last Admin: 05/21/17 10:06 Dose:  70 mg


Famotidine (Pepcid)  20 mg PO DAILY [facility]


   Last Admin: 05/21/17 10:05 Dose:  20 mg


Ferrous Sulfate (Feosol)  324 mg PO TID [facility]


   Last Admin: 05/21/17 10:05 Dose:  324 mg


Insulin Human Regular (Humulin R Med)  0 units SC ACHS [facility]


   PRN Reason: Protocol


   Last Admin: 05/21/17 12:21 Dose:  1 units


Lisinopril (Zestril)  10 mg PO DAILY [facility]


   Last Admin: 05/21/17 10:59 Dose:  10 mg


Metformin HCl (Glucophage)  1,000 mg PO BRKDIN [facility]


   Last Admin: 05/21/17 12:21 Dose:  1,000 mg


Nicotine (Nicoderm Cq)  1 patch TD DAILY [facility]


   Last Admin: 05/21/17 10:10 Dose:  1 patch


Pantoprazole Sodium (Protonix Ec Tab)  40 mg PO 0630 [facility]


   Last Admin: 05/21/17 05:33 Dose:  40 mg


Sotalol HCl (Betapace)  80 mg PO BID [facility]


   Last Admin: 05/21/17 10:05 Dose:  80 mg











- Labs


Labs: 


 





 05/21/17 07:30 





 05/20/17 08:44 





 











PT  10.7 Seconds (9.9-11.8)   05/21/17  07:30    


 


INR  0.99  (0.93-1.08)   05/21/17  07:30    


 


APTT  25.9 Seconds (23.7-30.8)   05/21/17  07:30    














Attending/Attestation





- Attestation


I have personally seen and examined this patient.: Yes


I have fully participated in the care of the patient.: Yes


I have reviewed all pertinent clinical information, including history, physical 

exam and plan: Yes


Notes (Text): 





05/21/17 14:36


Attending note;





Patient seen and examined with resident.





Patient is a 61 year old  male with past medical history of HTN, 

hypercholestrolemia, CAD, HCV, DM , anemia and asthma is admitted with chest 

pain.


Patient was found to be in new onset A. fib. Treated with IV metoprolol.





 Patient with a history of bradycardia in the past. Evaluated by cardiology Dr. Cortés. needs outpatient stress test.


Started on sotalol.  still with elevated heart rate.


Case discussed with  in detail.  po Cardizem added.


Currently on sotalol, lisinopril and Cardizem.


 we will discharge patient home with Eliquis. Needs close outpatient follow-up 

of hemoglobin.





History of anemia and blood transfusion in the past. Currently hemoglobin is 

stable. Patient had EGD with in 6 months which was normal as per daughter done 

at [place].


 


Patient had colonoscopy within the year.


Started on aspirin, Lovenox.


patient had cardiac cath in 6/2016 showed normal ejection fraction and 

nonobstructive  coronary disease.





Active smoking; smoking cessation is strongly advised. On NicoDerm patch.





Diabetes/hypertension and hypercholesterolemia; continue home medication.





The diagnosis and follow-up plan discussed with patient's daughter in detail by 

the bedside.





Upon discharge the patient will follow-up with PMD Dr. Morrison.

## 2017-05-21 NOTE — PN
DATE: 05/21/2017



FOLLOWUP



The patient denies chest pain.  He is still in slightly rapid atrial fibrillation.



PHYSICAL EXAMINATION:

VITAL SIGNS:  Blood pressure 137/74, heart rate 112, temperature 97.6, respirations 19.

HEENT:  Normocephalic.

NECK:  No JVD.

CHEST:  Clear.

HEART:  S1, S2 regular.

EXTREMITIES:  1+ pitting edema.



LABORATORIES:  Hemoglobin and hematocrit ____.3 and 47.2.  White count and platelet counts are within
 normal limits.



ASSESSMENT:

1.  Atypical chest pain.

2.  Atrial fibrillation.

3.  Smoking and chronic obstructive lung disease.

4.  Diabetes mellitus.

5.  History of anemia that has required blood transfusion in the past.



RECOMMENDATIONS:  Continue Lipitor ____ mg once a day, therapeutic subcutaneous Lovenox at 70 mg twic
e a day, Zestril 10 mg once, sotalol at 80 mg once a day.  Discontinue Norvasc and start Cardizem at 
30 mg q.i.d., hold for heart rate below 60.  



I discussed the case with Dr. Austin.  Either Coumadin or any of the new oral anticoagulant agents
 can be used.  The patient will follow up with his cardiologist in [place].  The patient did have 
a colonoscopy a year ago that was relayed to me as unremarkable.





__________________________________________

Thanh Paulson MD







cc:



DD: 05/21/2017 13:38:57  [id number]

TT: 05/21/2017 14:12:30

Confirmation # [id number]

Dictation # [id number]

jay

## 2017-05-22 VITALS
SYSTOLIC BLOOD PRESSURE: 121 MMHG | RESPIRATION RATE: 16 BRPM | TEMPERATURE: 98 F | DIASTOLIC BLOOD PRESSURE: 79 MMHG | HEART RATE: 82 BPM

## 2017-05-22 VITALS — OXYGEN SATURATION: 97 %

## 2017-05-22 LAB
ADD MANUAL DIFF?: NO
APTT BLD: 26.1 SECONDS (ref 23.7–30.8)
BASOPHILS # BLD AUTO: 0.02 K/MM3 (ref 0–2)
BASOPHILS NFR BLD: 0.3 % (ref 0–3)
BUN SERPL-MCNC: 20 MG/DL (ref 7–21)
CALCIUM SERPL-MCNC: 9.2 MG/DL (ref 8.4–10.5)
CHLORIDE SERPL-SCNC: 102 MMOL/L (ref 98–107)
CO2 SERPL-SCNC: 28 MMOL/L (ref 21–33)
EOSINOPHIL # BLD: 0.2 10*3/UL (ref 0–0.7)
EOSINOPHIL NFR BLD: 3.2 % (ref 1.5–5)
ERYTHROCYTE [DISTWIDTH] IN BLOOD BY AUTOMATED COUNT: 24.6 % (ref 11.5–14.5)
GLUCOSE SERPL-MCNC: 126 MG/DL (ref 70–110)
GRANULOCYTES # BLD: 3.89 10*3/UL (ref 1.4–6.5)
GRANULOCYTES NFR BLD: 55.8 % (ref 50–68)
HCT VFR BLD CALC: 43 % (ref 42–52)
INR PPP: 0.96 (ref 0.93–1.08)
LYMPHOCYTES # BLD: 2.3 10*3/UL (ref 1.2–3.4)
LYMPHOCYTES NFR BLD AUTO: 32.7 % (ref 22–35)
MCH RBC QN AUTO: 25.4 PG (ref 25–35)
MCHC RBC AUTO-ENTMCNC: 32.6 G/DL (ref 31–37)
MCV RBC AUTO: 77.9 FL (ref 80–105)
MONOCYTES # BLD AUTO: 0.6 10*3/UL (ref 0.1–0.6)
MONOCYTES NFR BLD: 8 % (ref 1–6)
PLATELET # BLD: 268 10^3/UL (ref 120–450)
PMV BLD AUTO: 9.5 FL (ref 7–11)
POTASSIUM SERPL-SCNC: 4.3 MMOL/L (ref 3.6–5)
SODIUM SERPL-SCNC: 139 MMOL/L (ref 132–148)
WBC # BLD AUTO: 7 10^3/UL (ref 4.5–11)

## 2017-05-22 RX ADMIN — ENOXAPARIN SODIUM SCH MG: 80 INJECTION SUBCUTANEOUS at 09:59

## 2017-05-22 RX ADMIN — PANTOPRAZOLE SODIUM SCH MG: 40 TABLET, DELAYED RELEASE ORAL at 05:29

## 2017-05-22 RX ADMIN — INSULIN HUMAN SCH: 100 INJECTION, SOLUTION PARENTERAL at 08:09

## 2017-05-22 RX ADMIN — INSULIN HUMAN SCH UNITS: 100 INJECTION, SOLUTION PARENTERAL at 12:04

## 2017-05-22 NOTE — CARD
--------------- APPROVED REPORT --------------





EKG Measurement

Heart [id number]

[id number]

[id number]

[id number]



<Conclusion>

Atrial fibrillation

Right bundle branch block

Abnormal ECG

## 2017-05-22 NOTE — CARD
--------------- APPROVED REPORT --------------





EKG Measurement

Heart [id number]

[id number]

[id number]

[id number]



<Conclusion>

Atrial fibrillation with rapid ventricular response

Right bundle branch block

Abnormal ECG

## 2017-05-22 NOTE — CP.PCM.DIS
<StevanChristiana - Last Filed: 05/22/17 15:52>





Provider





- Provider


Date of Admission: 


05/19/17 09:57





Attending physician: 


Raúl Edwards MD





Primary care physician: 


Bre Morrison DO





Consults: 





Cardiology: Dr. Cortés


Time Spent in preparation of Discharge (in minutes): 45





Diagnosis





- Discharge Diagnosis


(1) HLD (hyperlipidemia)


Status: Chronic   





(2) New onset a-fib


Status: Acute   





(3) History of GI bleed


Status: Chronic   





(4) Diabetes


Status: Chronic   





(5) Hypertension


Status: Chronic   





Hospital Course





- Lab Results


Lab Results: 


 Most Recent Lab Values











WBC  7.0 10^3/ul (4.5-11.0)   05/22/17  06:00    


 


RBC  5.52 10^6/uL (3.5-6.1)   05/22/17  06:00    


 


Hgb  14.0 gm/dL (14.0-18.0)   05/22/17  06:00    


 


Hct  43.0 % (42.0-52.0)   05/22/17  06:00    


 


MCV  77.9 fL (80.0-105.0)  L  05/22/17  06:00    


 


MCH  25.4 pg (25.0-35.0)   05/22/17  06:00    


 


MCHC  32.6 g/dl (31.0-37.0)   05/22/17  06:00    


 


RDW  24.6 % (11.5-14.5)  H  05/22/17  06:00    


 


Plt Count  268 10^3/uL (120.0-450.0)   05/22/17  06:00    


 


MPV  9.5 fl (7.0-11.0)   05/22/17  06:00    


 


Gran %  55.8 % (50.0-68.0)   05/22/17  06:00    


 


Lymph % (Auto)  32.7 % (22.0-35.0)   05/22/17  06:00    


 


Mono % (Auto)  8.0 % (1.0-6.0)  H  05/22/17  06:00    


 


Eos % (Auto)  3.2 % (1.5-5.0)   05/22/17  06:00    


 


Baso % (Auto)  0.3 % (0.0-3.0)   05/22/17  06:00    


 


Gran #  3.89  (1.4-6.5)   05/22/17  06:00    


 


Lymph #  2.3  (1.2-3.4)   05/22/17  06:00    


 


Mono #  0.6  (0.1-0.6)   05/22/17  06:00    


 


Eos #  0.2  (0.0-0.7)   05/22/17  06:00    


 


Baso #  0.02 K/mm3 (0.0-2.0)   05/22/17  06:00    


 


PT  10.4 Seconds (9.9-11.8)   05/22/17  06:00    


 


INR  0.96  (0.93-1.08)   05/22/17  06:00    


 


APTT  26.1 Seconds (23.7-30.8)   05/22/17  06:00    


 


Sodium  139 mmol/L (132-148)   05/22/17  09:30    


 


Potassium  4.3 mmol/L (3.6-5.0)   05/22/17  09:30    


 


Chloride  102 mmol/L ()   05/22/17  09:30    


 


Carbon Dioxide  28 mmol/L (21-33)   05/22/17  09:30    


 


Anion Gap  13  (10-20)   05/22/17  09:30    


 


BUN  20 mg/dL (7-21)   05/22/17  09:30    


 


Creatinine  0.8 mg/dL (0.5-1.4)   05/22/17  09:30    


 


Est GFR ( Amer)  > 60   05/22/17  09:30    


 


Est GFR (Non-Af Amer)  > 60   05/22/17  09:30    


 


POC Glucose (mg/dL)  136 mg/dL ()  H  05/22/17  07:21    


 


Random Glucose  126 mg/dL ()  H  05/22/17  09:30    


 


Calcium  9.2 mg/dL (8.4-10.5)   05/22/17  09:30    


 


Total Bilirubin  0.6 mg/dL (0.2-1.3)   05/19/17  08:20    


 


AST  23 U/L (15-59)   05/19/17  08:20    


 


ALT  32 U/L (7-56)   05/19/17  08:20    


 


Alkaline Phosphatase  56 U/L ()   05/19/17  08:20    


 


Total Creatine Kinase  138 U/L ()   05/19/17  08:20    


 


Troponin I  < 0.01 ng/mL  05/20/17  00:15    


 


NT-Pro-B Natriuret Pep  498 pg/mL (0-450)  H  05/19/17  08:20    


 


Total Protein  7.0 g/dL (5.8-8.3)   05/19/17  08:20    


 


Albumin  4.1 g/dL (3.0-4.8)   05/19/17  08:20    


 


Globulin  2.9 gm/dL  05/19/17  08:20    


 


Albumin/Globulin Ratio  1.4  (1.1-1.8)   05/19/17  08:20    


 


Triglycerides  271 mg/dL ()  H  05/20/17  08:44    


 


Cholesterol  171 mg/dL (130-200)   05/20/17  08:44    


 


LDL Cholesterol Direct  100 mg/dL (0-129)   05/20/17  08:44    


 


HDL Cholesterol  42 mg/dL (29-60)   05/20/17  08:44    


 


Thyroxine (T4)  9.7 ug/dL (5.5-11.0)   05/20/17  08:44    


 


TSH 3rd Generation  0.65 mIU/mL (0.46-4.68)   05/20/17  08:44    


 


Stool Occult Blood  Negative  (NEGATIVE)   05/21/17  12:00    


 


Digoxin  < 0.4 ng/mL (0.8-2.0)  L  05/19/17  08:20    














- Hospital Course


Hospital Course: 


 61 year old male with past medical history of HTN, hypercholestrolemia, CAD, 

HCV, DM, history of GI bleed and asthma is admitted for CP r/o ACS and new 

onset a fib. Patient did present 3 months prior with symptomatic bradycardia 

due to beta blockers.  At that time, patient's beta blockers were discontinued. 

In 12/2016, patient presented for GI bleed. On this admission, troponins x 3 

were negative and EKG showed no ST changes but continued to show A fib.  

Cardiology was consulted for recommendations about rate control medications and 

anticoagulation.  Patient required anticoagulation due to CHADvasc score of 2.  

However, HAS BLED score was calculated to be 2.  It was decided by cardiologist 

that patient will be started on sotalol 80 mg BID and Eliquis 5 mg po qd and he 

will follow up with cardiologist within a week for discharge for further 

medication adjustment.  Patient was started on low dose Eliquis due to history 

of GI bleed.  Patient's chest pain improved and hear rate was controlled for 

discharge.  





Pt is to follow up with PMD upon discharge.


Pt is to follow up with cardiologist, Dr. Cortés upon discharge within 1 week.


Patient has a stress test scheduled for 5/25/17 with Dr. Cortés. 


Discussed the risks of tobacco use with patient and recommended cessation.


Patient is to discontinue the following medications: Norvasc and Plavix


Patient is discharged with the following medications: Eliquis 5.0 mg po qd, 

Aspirin 81 mg po qd, Lipitor 20 mg po qd, Feosol 325 mg po qd, Zestril 20 mg po 

qd, Metformin 1000 mg BID, Sotalol 80 mg po BID. Medications were delivered to 

patient before discharge. 


Patient is to follow up with GI specialist at [facility] for hep C treatment.





Please see MAR for full details.    





- Date & Time of H&P


Date of H&P: 05/22/17


Time of H&P: 14:49





Discharge Exam





- Head Exam


Head Exam: ATRAUMATIC, NORMOCEPHALIC





- Eye Exam


Eye Exam: EOMI





- ENT Exam


ENT Exam: Mucous Membranes Moist





- Respiratory Exam


Respiratory Exam: Clear to PA & Lateral, NORMAL BREATHING PATTERN.  absent: 

Rales, Rhonchi, Wheezes





- Cardiovascular Exam


Cardiovascular Exam: REGULAR RHYTHM, RRR, +S1, +S2.  absent: Diastolic murmur, 

Gallop, Rubs, Systolic Murmur





- GI/Abdominal Exam


GI & Abdominal Exam: Normal Bowel Sounds, Soft, Unremarkable.  absent: Distended

, Firm, Guarding, Rigid, Tenderness





- Extremities Exam


Additional comments: 





no edema or tenderness 





- Neurological Exam


Neurological exam: Alert, Oriented x3





- Psychiatric Exam


Psychiatric exam: Normal Affect, Normal Mood





- Skin


Skin Exam: Dry, Intact, Normal Color, Warm





Discharge Plan





- Discharge Medications


Prescriptions: 


Apixaban [Eliquis] 5 mg PO DAILY #30 tablet


Aspirin [Ecotrin] 81 mg PO DAILY #30 


Atorvastatin [Lipitor] 20 mg PO DAILY #30 


Ferrous Sulfate [Feosol] 325 mg PO DAILY #30 


Lisinopril [Zestril] 20 mg PO DAILY #30 tab


MetFORMIN [glucoPHAGE] 1,000 mg PO BID #60 


Sotalol HCl [Sotalol] 80 mg PO BID #20 tablet





- Follow Up Plan


Condition: GUARDED


Disposition: HOME/ ROUTINE


Instructions:  Atrial Fibrillation (DC), Chest Pain (ED)


Additional Instructions: 


 Pt is to follow up with PMD upon discharge.


Pt is to follow up with cardiologist, Dr. Cortés upon discharge within 1 week.


Patient has a stress test scheduled for 5/25/17 with Dr. Cortés. 


Discussed the risks of tobacco use with patient and recommended cessation.


Patient is to discontinue the following medications: Norvasc and Plavix


Patient is discharged with the following medications: Eliquis 5.0 mg po qd, 

Aspirin 81 mg po qd, Lipitor 20 mg po qd, Feosol 325 mg po qd, Zestril 20 mg po 

qd, Metformin 1000 mg BID, Sotalol 80 mg po BID.


Referrals: 


Bre Morrison DO [Primary Care Provider] - 


Lance Cortés MD [Staff Provider] - 





<Raúl Edwards - Last Filed: 05/22/17 17:25>





Provider





- Provider


Date of Admission: 


05/19/17 09:57





Attending physician: 


Raúl Edwards MD





Primary care physician: 


Bre Morrison DO








[facility] Course





- Lab Results


Lab Results: 


 Most Recent Lab Values











WBC  7.0 10^3/ul (4.5-11.0)   05/22/17  06:00    


 


RBC  5.52 10^6/uL (3.5-6.1)   05/22/17  06:00    


 


Hgb  14.0 gm/dL (14.0-18.0)   05/22/17  06:00    


 


Hct  43.0 % (42.0-52.0)   05/22/17  06:00    


 


MCV  77.9 fL (80.0-105.0)  L  05/22/17  06:00    


 


MCH  25.4 pg (25.0-35.0)   05/22/17  06:00    


 


MCHC  32.6 g/dl (31.0-37.0)   05/22/17  06:00    


 


RDW  24.6 % (11.5-14.5)  H  05/22/17  06:00    


 


Plt Count  268 10^3/uL (120.0-450.0)   05/22/17  06:00    


 


MPV  9.5 fl (7.0-11.0)   05/22/17  06:00    


 


Gran %  55.8 % (50.0-68.0)   05/22/17  06:00    


 


Lymph % (Auto)  32.7 % (22.0-35.0)   05/22/17  06:00    


 


Mono % (Auto)  8.0 % (1.0-6.0)  H  05/22/17  06:00    


 


Eos % (Auto)  3.2 % (1.5-5.0)   05/22/17  06:00    


 


Baso % (Auto)  0.3 % (0.0-3.0)   05/22/17  06:00    


 


Gran #  3.89  (1.4-6.5)   05/22/17  06:00    


 


Lymph #  2.3  (1.2-3.4)   05/22/17  06:00    


 


Mono #  0.6  (0.1-0.6)   05/22/17  06:00    


 


Eos #  0.2  (0.0-0.7)   05/22/17  06:00    


 


Baso #  0.02 K/mm3 (0.0-2.0)   05/22/17  06:00    


 


PT  10.4 Seconds (9.9-11.8)   05/22/17  06:00    


 


INR  0.96  (0.93-1.08)   05/22/17  06:00    


 


APTT  26.1 Seconds (23.7-30.8)   05/22/17  06:00    


 


Sodium  139 mmol/L (132-148)   05/22/17  09:30    


 


Potassium  4.3 mmol/L (3.6-5.0)   05/22/17  09:30    


 


Chloride  102 mmol/L ()   05/22/17  09:30    


 


Carbon Dioxide  28 mmol/L (21-33)   05/22/17  09:30    


 


Anion Gap  13  (10-20)   05/22/17  09:30    


 


BUN  20 mg/dL (7-21)   05/22/17  09:30    


 


Creatinine  0.8 mg/dL (0.5-1.4)   05/22/17  09:30    


 


Est GFR ( Amer)  > 60   05/22/17  09:30    


 


Est GFR (Non-Af Amer)  > 60   05/22/17  09:30    


 


POC Glucose (mg/dL)  167 mg/dL ()  H  05/22/17  11:02    


 


Random Glucose  126 mg/dL ()  H  05/22/17  09:30    


 


Calcium  9.2 mg/dL (8.4-10.5)   05/22/17  09:30    


 


Total Bilirubin  0.6 mg/dL (0.2-1.3)   05/19/17  08:20    


 


AST  23 U/L (15-59)   05/19/17  08:20    


 


ALT  32 U/L (7-56)   05/19/17  08:20    


 


Alkaline Phosphatase  56 U/L ()   05/19/17  08:20    


 


Total Creatine Kinase  138 U/L ()   05/19/17  08:20    


 


Troponin I  < 0.01 ng/mL  05/20/17  00:15    


 


NT-Pro-B Natriuret Pep  498 pg/mL (0-450)  H  05/19/17  08:20    


 


Total Protein  7.0 g/dL (5.8-8.3)   05/19/17  08:20    


 


Albumin  4.1 g/dL (3.0-4.8)   05/19/17  08:20    


 


Globulin  2.9 gm/dL  05/19/17  08:20    


 


Albumin/Globulin Ratio  1.4  (1.1-1.8)   05/19/17  08:20    


 


Triglycerides  271 mg/dL ()  H  05/20/17  08:44    


 


Cholesterol  171 mg/dL (130-200)   05/20/17  08:44    


 


LDL Cholesterol Direct  100 mg/dL (0-129)   05/20/17  08:44    


 


HDL Cholesterol  42 mg/dL (29-60)   05/20/17  08:44    


 


Thyroxine (T4)  9.7 ug/dL (5.5-11.0)   05/20/17  08:44    


 


TSH 3rd Generation  0.65 mIU/mL (0.46-4.68)   05/20/17  08:44    


 


Stool Occult Blood  Negative  (NEGATIVE)   05/21/17  12:00    


 


Digoxin  < 0.4 ng/mL (0.8-2.0)  L  05/19/17  08:20    














Attending/Attestation





- Attestation


I have personally seen and examined this patient.: Yes


I have fully participated in the care of the patient.: Yes


I have reviewed all pertinent clinical information, including history, physical 

exam and plan: Yes


Notes (Text): 





I have seen and examined patient with the resident. Agree with the above note 

with the following additions/ exceptions: Briefly this is 61 year old  

male with history of HTN, hypercholestrolemia, CAD, known and untreated Hep C, 

DM-2, anemia and asthma is admitted with chest pain. ACS ruled out. Patient was 

found to be in new onset A.fib. CHADvasc score is 2. HASBLED score of 2. 

Cardiologist recommended to start low dose eliquis and stop plavix. Continue 

sotalol, aspirin and lipitor. He has a history of anemia and blood transfusion 

in the past. Currently hemoglobin is stable. Patient had EGD within 6 months 

which was normal at [facility]. Patient had colonoscopy within the 

year and it was normal. Patient had cardiac cath in 6/2016 showed normal 

ejection fraction and nonobstructive  coronary disease and is scheduled for 

stress test on 5/25/2017. Smoking cessation counselling provided. Plan 

discussed in detail with the patient and his son at the bedside. Upon discharge 

the patient will follow-up with PMD Dr. Morrison, Dr Cortés and Gastroenterologist .


Dr Raúl Edwards

## 2017-05-22 NOTE — CARD
--------------- APPROVED REPORT --------------





EKG Measurement

Heart [id number]

[id number]

[id number]

[id number]



<Conclusion>

Atrial fibrillation with rapid ventricular response

Right bundle branch block

T wave abnormality, consider inferior ischemia or digitalis effect

Abnormal ECG

## 2017-05-22 NOTE — PN
DATE: 05/20/2017



ADDENDUM



The patient has diffuse atherosclerosis from his smoking.:  Risk/benefit wise would favor continuing 
his aspirin and Plavix.  We will start patient on sotalol in an attempt to convert back to normal sin
us rhythm.  His outpatient workup will determine whether the patient goes on anticoagulation.





__________________________________________

Lance Cortés MD







cc:



DD: 05/20/2017 08:03:39  [id number]

TT: 05/20/2017 09:04:38

Confirmation # [id number]

Dictation # [id number]

tn

## 2017-05-22 NOTE — PN
DATE: 05/22/2017



SUBJECTIVE:  The patient reported to me sharp retrosternal chest pain that had 
resolved earlier.  He is currently in atrial fibrillation with controlled 
ventricular response.



PHYSICAL EXAMINATION:

VITAL SIGNS:  Blood pressure 121/79, heart rate 82, temperature 98, 
respirations 16.

HEENT:  Normocephalic.

NECK:  No JVD.

CHEST:  Clear.

HEART:  S1, S2 regular.

EXTREMITIES:  No edema.



LABORATORIES:  Hemoglobin and hematocrit 14 and 43.  White count and platelet 
count are within normal limits.  Today's SMA-7 is within normal limits except 
for glucose 126.  Today's INR is ____.  



ASSESSMENT:

1.  Atypical chest pain.

2.  Chronic atrial fibrillation.

3.  Chronic obstructive lung disease.

4.  Diabetes mellitus.



RECOMMENDATIONS:  Continue Betapace at 80 mg _bid,D/C Cardizem ,Continue 
aspirin 81 mg once a day, Glucophage 1 gram twice a day, Lipitor 20 mg once a 
day, ____ subcutaneous Lovenox at 70 mg once a day, Zestril at 10 mg once a 
day.  I would repeat 12-lead EKG today and start Eliquis at 5 mg mg orally 
today.





__________________________________________

Thanh Paulson MD







cc:   



DD: 05/22/2017 14:21:08  [id number]

TT: 05/22/2017 14:38:53

Confirmation # [id number]

Dictation # [id number]

sn

MTDD

## 2017-08-20 ENCOUNTER — HOSPITAL ENCOUNTER (EMERGENCY)
Dept: HOSPITAL 42 - ED | Age: 62
Discharge: HOME | End: 2017-08-20
Payer: MEDICAID

## 2017-08-20 VITALS
OXYGEN SATURATION: 96 % | RESPIRATION RATE: 18 BRPM | TEMPERATURE: 97.6 F | DIASTOLIC BLOOD PRESSURE: 72 MMHG | SYSTOLIC BLOOD PRESSURE: 156 MMHG | HEART RATE: 60 BPM

## 2017-08-20 VITALS — BODY MASS INDEX: 27.6 KG/M2

## 2017-08-20 DIAGNOSIS — R10.30: ICD-10-CM

## 2017-08-20 DIAGNOSIS — I48.91: ICD-10-CM

## 2017-08-20 DIAGNOSIS — E78.5: ICD-10-CM

## 2017-08-20 DIAGNOSIS — I10: ICD-10-CM

## 2017-08-20 DIAGNOSIS — E11.9: ICD-10-CM

## 2017-08-20 DIAGNOSIS — N43.3: Primary | ICD-10-CM

## 2017-08-20 LAB
ALBUMIN/GLOB SERPL: 1.5 {RATIO} (ref 1.1–1.8)
ALP SERPL-CCNC: 53 U/L (ref 38–133)
ALT SERPL-CCNC: 31 U/L (ref 7–56)
AMORPH SED URNS QL MICRO: (no result)
APPEARANCE UR: CLEAR
AST SERPL-CCNC: 23 U/L (ref 15–59)
BACTERIA #/AREA URNS HPF: (no result) /[HPF]
BASOPHILS # BLD AUTO: 0.02 K/MM3 (ref 0–2)
BASOPHILS NFR BLD: 0.3 % (ref 0–3)
BILIRUB SERPL-MCNC: 0.1 MG/DL (ref 0.2–1.3)
BILIRUB UR-MCNC: NEGATIVE MG/DL
BUN SERPL-MCNC: 15 MG/DL (ref 7–21)
CALCIUM SERPL-MCNC: 8.6 MG/DL (ref 8.4–10.5)
CHLORIDE SERPL-SCNC: 108 MMOL/L (ref 98–107)
CO2 SERPL-SCNC: 25 MMOL/L (ref 21–33)
COLOR UR: YELLOW
EOSINOPHIL # BLD: 0.3 10*3/UL (ref 0–0.7)
EOSINOPHIL NFR BLD: 4.4 % (ref 1.5–5)
EPI CELLS #/AREA URNS HPF: (no result) /HPF (ref 0–5)
ERYTHROCYTE [DISTWIDTH] IN BLOOD BY AUTOMATED COUNT: 13.7 % (ref 11.5–14.5)
GLOBULIN SER-MCNC: 2.5 GM/DL
GLUCOSE SERPL-MCNC: 74 MG/DL (ref 70–110)
GLUCOSE UR STRIP-MCNC: NEGATIVE MG/DL
GRANULOCYTES # BLD: 3.63 10*3/UL (ref 1.4–6.5)
GRANULOCYTES NFR BLD: 52.2 % (ref 50–68)
HCT VFR BLD CALC: 42.6 % (ref 42–52)
KETONES UR STRIP-MCNC: NEGATIVE MG/DL
LEUKOCYTE ESTERASE UR-ACNC: NEGATIVE LEU/UL
LYMPHOCYTES # BLD: 2.5 10*3/UL (ref 1.2–3.4)
LYMPHOCYTES NFR BLD AUTO: 35.4 % (ref 22–35)
MCH RBC QN AUTO: 29.5 PG (ref 25–35)
MCHC RBC AUTO-ENTMCNC: 33.8 G/DL (ref 31–37)
MCV RBC AUTO: 87.3 FL (ref 80–105)
MONOCYTES # BLD AUTO: 0.5 10*3/UL (ref 0.1–0.6)
MONOCYTES NFR BLD: 7.7 % (ref 1–6)
PH UR STRIP: 6 [PH] (ref 4.7–8)
PLATELET # BLD: 276 10^3/UL (ref 120–450)
PMV BLD AUTO: 9.8 FL (ref 7–11)
POTASSIUM SERPL-SCNC: 4.5 MMOL/L (ref 3.6–5)
PROT SERPL-MCNC: 6.2 G/DL (ref 5.8–8.3)
PROT UR STRIP-MCNC: 30 MG/DL
RBC # UR STRIP: NEGATIVE /UL
RBC #/AREA URNS HPF: (no result) /HPF (ref 0–2)
SODIUM SERPL-SCNC: 141 MMOL/L (ref 132–148)
SP GR UR STRIP: >= 1.03 (ref 1–1.03)
UROBILINOGEN UR STRIP-ACNC: 1 E.U./DL
WBC # BLD AUTO: 7 10^3/UL (ref 4.5–11)

## 2017-08-20 PROCEDURE — 99284 EMERGENCY DEPT VISIT MOD MDM: CPT

## 2017-08-20 PROCEDURE — 81001 URINALYSIS AUTO W/SCOPE: CPT

## 2017-08-20 PROCEDURE — 87491 CHLMYD TRACH DNA AMP PROBE: CPT

## 2017-08-20 PROCEDURE — 80053 COMPREHEN METABOLIC PANEL: CPT

## 2017-08-20 PROCEDURE — 93975 VASCULAR STUDY: CPT

## 2017-08-20 PROCEDURE — 87591 N.GONORRHOEAE DNA AMP PROB: CPT

## 2017-08-20 PROCEDURE — 96374 THER/PROPH/DIAG INJ IV PUSH: CPT

## 2017-08-20 PROCEDURE — 74176 CT ABD & PELVIS W/O CONTRAST: CPT

## 2017-08-20 PROCEDURE — 85025 COMPLETE CBC W/AUTO DIFF WBC: CPT

## 2017-08-20 NOTE — US
HISTORY:

testicular pain



TECHNIQUE:

Realtime sonography through the scrotum with color and doppler flow.



COMPARISON:

None Available.



FINDINGS:



RIGHT TESTICLE:

Measures 4.0 x 2.5 x 3.1 cm. Homogeneous echotexture. No mass. Normal 

flow demonstrated.



RIGHT EPIDIDYMIS:

Normal size and morphology 



LEFT TESTICLE:

Status post left orchiectomy 



LEFT EPIDIDYMIS:

Left orchiectomy 



HYDROCELE:

Trace right hydrocele



VARICOCELE:

None.



OTHER FINDINGS:

None. 



IMPRESSION:

Status post left orchiectomy.  No evidence of right testicular 

torsion or epididymo-orchitis.  Trace right hydrocele.

## 2017-08-20 NOTE — CT
PROCEDURE:  CT Abdomen and Pelvis without intravenous contrast



HISTORY:

R flank pain r/o kidney stone



COMPARISON:

12/23/2016



TECHNIQUE:

Without contrast.. 



Contrast Dose: 0



Radiation dose:



Total exam DLP = 569.60 mGy-cm.



This CT exam was performed using one or more of the following dose 

reduction techniques: Automated exposure control, adjustment of the 

mA and/or kV according to patient size, and/or use of iterative 

reconstruction technique.



FINDINGS:



LOWER THORAX:

Unremarkable. 



LIVER:

Unremarkable. No gross lesion or ductal dilatation.  



GALLBLADDER AND BILE DUCTS:

Unremarkable. 



PANCREAS:

Unremarkable. No gross lesion or ductal dilatation.



SPLEEN:

Unremarkable. 



ADRENALS:

Unremarkable. No mass. 



KIDNEYS AND URETERS:

Unremarkable. No hydronephrosis. No solid mass. No renal or ureteral 

calculus. Mid left renal cortical cyst, 2.8 cm diameter. 



VASCULATURE:

Unremarkable. No aortic aneurysm. 



BOWEL:

Unremarkable. No obstruction. No gross mural thickening. 



APPENDIX:

Unremarkable. Normal appendix. 



PERITONEUM:

No ascites. There is a small umbilical hernia containing mesenteric 

fat.  There are 3 small supraumbilical ventral hernias containing 

only mesenteric fat.  There is no bowel herniation. 



LYMPH NODES:

Unremarkable. No enlarged lymph nodes. 



BLADDER:

Nondistended 



REPRODUCTIVE:

Normal prostate 



BONES:

No acute fracture. 



OTHER FINDINGS:

None.



IMPRESSION:

No evidence of urinary calculus or urinary tract obstruction.  Small 

umbilical hernia and 3 small supraumbilical ventral hernias all 

containing mesenteric fat and no bowel.

## 2017-08-20 NOTE — ED PDOC
Arrival/HPI





- General


Chief Complaint: Male Genitourinary


Time Seen by Provider: 08/20/17 14:20


Historian: Patient





- History of Present Illness


Narrative History of Present Illness (Text): 


08/20/17 14:22





A 61 year old male whose past medical history includes, hypertension, 

hyperlipidemia, diabetes, and Atrial Fibrillation, presents the emergency 

department with 3 day duration testicular pain that radiates from the groin to 

the right lower back. The patient notes that he had a hernia repair and only 

has 1 testicle. The patient denies fevers, chills, cough, chest pain, nausea, 

vomiting, diarrhea, abdominal pain, dysuria, hematuria, penile discharge, or 

any other complaint.








Time/Duration: Other (3 Days)


Symptom Onset: Sudden


Symptom Course: Unchanged


Activities at Onset: Rest, Light


Context: Home





Past Medical History





- Provider Review


Nursing Documentation Reviewed: Yes





- Infectious Disease


Hx of Infectious Diseases: None





- Tetanus Immunization


Tetanus Immunization: Unknown





- Cardiac


Hx Cardiac Disorders: Yes (chest pain)


Hx Hypertension: Yes


Other/Comment: cardiac cath





- Pulmonary


Hx Respiratory Disorders: Yes


Hx Asthma: Yes


Hx Chronic Obstructive Pulmonary Disease (COPD): Yes





- Neurological


Hx Dizziness: Yes





- HEENT


Hx HEENT Disorder: Yes


Hx Deafness: Yes (right ear [place])





- Renal


Hx Renal Disorder: No





- Endocrine/Metabolic


Hx Endocrine Disorders: Yes


Hx Diabetes Mellitus Type 2: Yes (iddm)





- Hematological/Oncological


Hx Anemia: Yes





- Integumentary


Hx Dermatological Disorder: Yes


Other/Comment: bruises on legs work related pt is a 





- Musculoskeletal/Rheumatological


Hx Falls: No





- Gastrointestinal


Hx Gastrointestinal Disorders: No





- Genitourinary/Gynecological


Hx Genitourinary Disorders: No





- Psychiatric


Hx Psychophysiologic Disorder: No


Hx Substance Use: No





- Surgical History


Other/Comment: colonoscopy/endoscopy, rxcision of left axillary cyst, b/l 

inguinal hrnia repair, t&a





- Anesthesia


Hx Anesthesia: Yes


Hx Anesthesia Reactions: No


Hx Malignant Hyperthermia: No





- Suicidal Assessment


Feels Threatened In Home Enviroment: No





Family/Social History





- Physician Review


Nursing Documentation Reviewed: Yes


Family/Social History: No Known Family HX


Smoking Status: Light Smoker < 10 Cigarettes Daily


Hx Alcohol Use: No


Hx Substance Use: No


Hx Substance Use Treatment: No





Allergies/Home Meds


Allergies/Adverse Reactions: 


Allergies





No Known Allergies Allergy (Verified 05/19/17 08:14)


 











Review of Systems





- Physician Review


All systems were reviewed & negative as marked: Yes





- Review of Systems


Constitutional: absent: Fevers, Night Sweats


Respiratory: absent: SOB, Cough


Cardiovascular: absent: Chest Pain


Gastrointestinal: absent: Abdominal Pain, Diarrhea, Nausea, Vomiting


Genitourinary Male: Other (testicular pain).  absent: Dysuria, Hematuria


Musculoskeletal: Back Pain (right lower back pain)


Neurological: absent: Headache, Dizziness





Physical Exam


Vital Signs Reviewed: Yes


Vital Signs











  Temp Pulse Resp BP Pulse Ox


 


 08/20/17 17:13  97.6 F  60  18  156/72 H  96


 


 08/20/17 14:51    16   98


 


 08/20/17 14:12  98.6 F  68  16  160/75 H  98











Temperature: Afebrile


Blood Pressure: Hypertensive


Pulse: Regular


Respiratory Rate: Normal


Appearance: Positive for: Well-Appearing, Non-Toxic, Comfortable


Pain Distress: None


Mental Status: Positive for: Alert and Oriented X 3





- Systems Exam


Head: Present: Atraumatic, Normocephalic


Pupils: Present: PERRL


Extroacular Muscles: Present: EOMI


Conjunctiva: Present: Normal


Mouth: Present: Moist Mucous Membranes


Neck: Present: Normal Range of Motion


Respiratory/Chest: Present: Clear to Auscultation, Good Air Exchange.  No: 

Respiratory Distress, Accessory Muscle Use


Cardiovascular: Present: Regular Rate and Rhythm, Normal S1, S2.  No: Murmurs


Abdomen: Present: Normal Bowel Sounds.  No: Tenderness, Distention, Peritoneal 

Signs, Rebound, Guarding


Genitourinary Male: Present: Testicle Tenderness (posterior testicle tenderness)

.  No: Penile Discharge, Hernias, Testicle Swelling


Back: Present: CVA Tenderness (right CVA Tenderness)


Upper Extremity: Present: Normal Inspection.  No: Cyanosis, Edema


Lower Extremity: Present: Normal Inspection.  No: Edema


Neurological: Present: GCS=15, CN II-XII Intact, Speech Normal


Skin: Present: Warm, Dry, Normal Color.  No: Rashes


Psychiatric: Present: Alert, Oriented x 3, Normal Insight, Normal Concentration





Medical Decision Making


ED Course and Treatment: 


08/20/17 14:28





Impression:


A 61 year old male with a complaint of testicular pain radiating from the groin 

to the right lower back.





Differential Diagnosis included but are not limited to:  testicular torsion vs. 

epididymitis vs. kidney stones





Plan:


-- Abdomen/Pelvis CT 


-- Testes US


-- Urinalysis


-- Toradol and IV Fluids


-- Labs 


-- Reassess and disposition





Progress Notes:





08/20/17 19:00


Patient's CT and US reviewed. Noted to have Hydroceles. On reevaluation, 

patient felt much better. His abdomen was soft, NT, ND, BS x 4. No CVAT. These 

symptoms are perhaps MSK in nature back and groin. Patient was advised to take 

pain medications as needed and to return to the ED if symptoms worsen or any 

other concern. 








- Lab Interpretations


Lab Results: 








 08/20/17 14:30 





 08/20/17 17:00 





 Lab Results





08/20/17 17:00: Sodium 141, Potassium 4.5, Chloride 108 H, Carbon Dioxide 25, 

Anion Gap 13, BUN 15, Creatinine 0.7, Est GFR (African Amer) > 60, Est GFR (Non-

Af Amer) > 60, Random Glucose 74, Calcium 8.6, Total Bilirubin 0.1 L, AST 23, 

ALT 31, Alkaline Phosphatase 53, Total Protein 6.2, Albumin 3.8, Globulin 2.5, 

Albumin/Globulin Ratio 1.5


08/20/17 14:30: Urine Color Yellow, Urine Appearance Clear, Urine pH 6.0, Ur 

Specific Gravity >= 1.030, Urine Protein 30 H, Urine Glucose (UA) Negative, 

Urine Ketones Negative, Urine Blood Negative, Urine Nitrate Negative, Urine 

Bilirubin Negative, Urine Urobilinogen 1.0 H, Ur Leukocyte Esterase Negative, 

Urine RBC 0 - 2, Urine WBC 1 - 3, Ur Epithelial Cells None, Amorphous Sediment 

Few, Urine Bacteria Many


08/20/17 14:30: WBC 7.0, RBC 4.88, Hgb 14.4, Hct 42.6, MCV 87.3, MCH 29.5, MCHC 

33.8, RDW 13.7, Plt Count 276, MPV 9.8, Gran % 52.2, Lymph % (Auto) 35.4 H, 

Mono % (Auto) 7.7 H, Eos % (Auto) 4.4, Baso % (Auto) 0.3, Gran # 3.63, Lymph # 

2.5, Mono # 0.5, Eos # 0.3, Baso # 0.02








I have reviewed the lab results: Yes





- RAD Interpretation


Radiology Orders: 








08/20/17 14:27


TESTES DUPLEX COMPLETE [US] Stat 





08/20/17 14:28


ABD & PELVIS W/O PO OR IV CONT [CT] Stat 














- Medication Orders


Current Medication Orders: 











Discontinued Medications





Sodium Chloride (Sodium Chloride 0.9%)  1,000 mls @ 100 mls/hr IV .Q10H STA


   Stop: 08/21/17 00:27


   Last Admin: 08/20/17 14:46  Dose: 100 mls/hr





Ketorolac Tromethamine (Toradol)  30 mg IVP STAT STA


   Stop: 08/20/17 14:29


   Last Admin: 08/20/17 14:45  Dose: 30 mg





Tramadol HCl (Ultram)  50 mg PO STAT STA


   Stop: 08/20/17 18:48


   Last Admin: 08/20/17 18:56  Dose: 50 mg











- Scribe Statement


The provider has reviewed the documentation as recorded by the Brandonibradha Murphy 





Provider Scribe Attestation:


All medical record entries made by the Scribe were at my direction and 

personally dictated by me. I have reviewed the chart and agree that the record 

accurately reflects my personal performance of the history, physical exam, 

medical decision making, and the department course for this patient. I have 

also personally directed, reviewed, and agree with the discharge instructions 

and disposition.








Disposition/Present on Arrival





- Present on Arrival


Any Indicators Present on Arrival: No


History of DVT/PE: No


History of Uncontrolled Diabetes: No


Urinary Catheter: No


History of Decub. Ulcer: No


History Surgical Site Infection Following: None





- Disposition


Have Diagnosis and Disposition been Completed?: Yes


Diagnosis: 


 Groin pain, Hydrocele





Disposition: HOME/ ROUTINE


Disposition Time: 19:00


Patient Plan: Discharge


Condition: IMPROVED


Discharge Instructions (ExitCare):  Hydrocele (ED), Groin Pain (ED)


Additional Instructions: 





Mr Almazan, thank you for letting us take care of you today. Your provider was 

Dr. Siu. You were treated for Groin Pain, Hydrocele. The emergency medical 

care you received today was directed at your acute symptoms. If you were 

prescribed any medication, please fill it and take as directed. It may take 

several days for your symptoms to resolve. Return to the Emergency Department 

if your symptoms worsen, do not improve, or if you have any other problems.





Please contact your doctor or call one of the physicians/clinics you have been 

referred to that are listed on the Patient Visit Information form that is 

included in your discharge packet. Bring any paperwork you were given at 

discharge with you along with any medications you are taking to your follow up 

visit. Our treatment cannot replace ongoing medical care by a primary care 

provider (PCP) outside of the emergency department.





Thank you for allowing the [facility] team to be part of your care today.








If you had an X-Ray or CT scan: A Radiologist will review the ED reading if any 

change in treatment is needed we will contact you.***





If you had a blood, urine, or wound culture: It will take several days for the 

results, if any change in treatment is needed we will contact you.***





If you had an STI test: It will take 48 hours for the results. Please call 

after 1 week if you have not heard back.***


Prescriptions: 


traMADol [Ultram] 50 mg PO Q6H PRN #20 tab


 PRN Reason: Pain, Moderate (4-7)


Referrals: 


Bre Morrison DO [Primary Care Provider] - Follow up with primary


Forms:  [employer] (English), WORK NOTE

## 2017-12-18 ENCOUNTER — HOSPITAL ENCOUNTER (OUTPATIENT)
Dept: HOSPITAL 42 - CATH | Age: 62
Discharge: HOME | End: 2017-12-18
Payer: MEDICAID

## 2017-12-18 VITALS — HEART RATE: 56 BPM | DIASTOLIC BLOOD PRESSURE: 64 MMHG | SYSTOLIC BLOOD PRESSURE: 132 MMHG

## 2017-12-18 VITALS — OXYGEN SATURATION: 99 %

## 2017-12-18 VITALS — TEMPERATURE: 97.9 F

## 2017-12-18 VITALS — RESPIRATION RATE: 18 BRPM

## 2017-12-18 VITALS — BODY MASS INDEX: 26.6 KG/M2

## 2017-12-18 DIAGNOSIS — I10: ICD-10-CM

## 2017-12-18 DIAGNOSIS — Z79.84: ICD-10-CM

## 2017-12-18 DIAGNOSIS — Z87.891: ICD-10-CM

## 2017-12-18 DIAGNOSIS — E11.9: ICD-10-CM

## 2017-12-18 DIAGNOSIS — E78.5: ICD-10-CM

## 2017-12-18 DIAGNOSIS — Z79.82: ICD-10-CM

## 2017-12-18 DIAGNOSIS — I25.110: Primary | ICD-10-CM

## 2017-12-18 LAB
APTT BLD: 28 SECONDS (ref 25.1–36.5)
BASOPHILS # BLD AUTO: 0.01 K/MM3 (ref 0–2)
BASOPHILS NFR BLD: 0.1 % (ref 0–3)
BUN SERPL-MCNC: 15 MG/DL (ref 7–21)
CALCIUM SERPL-MCNC: 9.2 MG/DL (ref 8.4–10.5)
CHLORIDE SERPL-SCNC: 106 MMOL/L (ref 98–107)
CHOLEST SERPL-MCNC: 147 MG/DL (ref 130–200)
CO2 SERPL-SCNC: 27 MMOL/L (ref 21–33)
EOSINOPHIL # BLD: 0.3 10*3/UL (ref 0–0.7)
EOSINOPHIL NFR BLD: 4.4 % (ref 1.5–5)
ERYTHROCYTE [DISTWIDTH] IN BLOOD BY AUTOMATED COUNT: 13.1 % (ref 11.5–14.5)
GLUCOSE SERPL-MCNC: 140 MG/DL (ref 70–110)
GRANULOCYTES # BLD: 4.71 10*3/UL (ref 1.4–6.5)
GRANULOCYTES NFR BLD: 67.3 % (ref 50–68)
HCT VFR BLD CALC: 44 % (ref 42–52)
INR PPP: 1.03 (ref 0.93–1.08)
LYMPHOCYTES # BLD: 1.6 10*3/UL (ref 1.2–3.4)
LYMPHOCYTES NFR BLD AUTO: 22.4 % (ref 22–35)
MCH RBC QN AUTO: 29.3 PG (ref 25–35)
MCHC RBC AUTO-ENTMCNC: 32.7 G/DL (ref 31–37)
MCV RBC AUTO: 89.6 FL (ref 80–105)
MONOCYTES # BLD AUTO: 0.4 10*3/UL (ref 0.1–0.6)
MONOCYTES NFR BLD: 5.8 % (ref 1–6)
PLATELET # BLD: 265 10^3/UL (ref 120–450)
PMV BLD AUTO: 9.8 FL (ref 7–11)
POTASSIUM SERPL-SCNC: 4.5 MMOL/L (ref 3.6–5)
SODIUM SERPL-SCNC: 142 MMOL/L (ref 132–148)
WBC # BLD AUTO: 7 10^3/UL (ref 4.5–11)

## 2017-12-18 PROCEDURE — 80048 BASIC METABOLIC PNL TOTAL CA: CPT

## 2017-12-18 PROCEDURE — 85610 PROTHROMBIN TIME: CPT

## 2017-12-18 PROCEDURE — 86900 BLOOD TYPING SEROLOGIC ABO: CPT

## 2017-12-18 PROCEDURE — 85730 THROMBOPLASTIN TIME PARTIAL: CPT

## 2017-12-18 PROCEDURE — 86850 RBC ANTIBODY SCREEN: CPT

## 2017-12-18 PROCEDURE — 93005 ELECTROCARDIOGRAM TRACING: CPT

## 2017-12-18 PROCEDURE — 99152 MOD SED SAME PHYS/QHP 5/>YRS: CPT

## 2017-12-18 PROCEDURE — 80061 LIPID PANEL: CPT

## 2017-12-18 PROCEDURE — 36415 COLL VENOUS BLD VENIPUNCTURE: CPT

## 2017-12-18 PROCEDURE — 93458 L HRT ARTERY/VENTRICLE ANGIO: CPT

## 2017-12-18 PROCEDURE — 85025 COMPLETE CBC W/AUTO DIFF WBC: CPT

## 2017-12-18 NOTE — CARD
--------------- APPROVED REPORT --------------





Procedure(s) performed: Left Heart Catheterization



HISTORY

The patient is a 62 year-old male with a history of : previous MI 

(> 7 days), most recent EF: 70%. (EF Method: RADIONUCLIDE), 

diabetes mellitus with oral treatment , chronic lung disease, 

previous diagnostic cath, tobacco history() : The patient is a former 

smoker , hypertension , dyslipidemia , THOMPSON and Chest pain on exertion,

 and abnormal stress test.



INDICATION

The indication(s) include : positive stress test, dyspnea.



CASE TECHNIQUE

The patient was brought electively to the Cardiac Catheterization 

Laboratory in a fasting state and was prepped and draped in a sterile 

manner. The left wrist was infiltrated with 2% Lidocaine subcutaneous 

anesthesia. A 6FR GLIDESHEATH ACCESS KIT sheath was inserted into the 

left radial artery without difficulty. Coronary angiography was 

performed using coronary diagnostic catheters. The left coronary 

system was accessed and visualized with a Diagnostic ,5 Fr JL 4 

catheter. The right coronary system was accessed and visualized with 

a 5 Fr JR 4 catheter. The left ventricle was accessed and visualized 

with a 5 Fr Pigtail 145 (Angled) catheter. Left ventricular/Aortic 

Valve gradient assessed on pullback. Left ventriculogram was 

performed in ELLER projection. The patient tolerated the procedure well 

and there were no complications associated with the procedure. 



Vessel Analysis

The patient's coronary anatomy is right dominant.



The left main coronary artery is a medium size vessel with intimal 

irregularities. The left main bifurcates to the left anterior 

descending and circumflex.



The left anterior descending artery is a medium size vessel with 

diffuse calcification noted throughout this vessel and without 

significant stenosis. The first diagonal branch is a medium size 

vessel with diffuse calcification noted throughout this vessel and 

without significant stenosis. The second diagonal branch is a medium 

size vessel with diffuse calcification noted throughout this vessel 

and without significant stenosis. 



The circumflex artery is a medium size vessel with diffuse 

calcification noted throughout this vessel and with significant 

stenosis. There is a 60% stenosis in the very distal segment. less 

than 1.5 mm vessel The first obtuse marginal branch is a medium size 

vessel without significant stenosis, But intra myocardial proximally. 



The right coronary artery is a medium size vessel with intimal 

irregularities. The right posterior descending artery is a medium 

size vessel with intimal irregularities and without significant 

stenosis. The right posterolateral branch is a small size vessel 

without significant stenosis. 



Left Ventricle

The left ventricle is normal in size with normal contractility. There 

was no cardiomyopathy. The left ventricular ejection fraction is 

estimated to be 65-70%. The left ventricular end diastolic pressure 

is 10-12 mmHg. There was no gradient across the aortic valve upon 

pullback.



Conclusion

Non obstructive CAD limited to Very Dista

l CX 60%, less than 1.5 mm vessel.

OM1  proximally intra myocardial course ( Myocardial Brigde).

Preserved LV Fx. EF-65-70%, BHE13-08 mmof Hg.



Recommendations

Aggressive Medical TherapyCardiac Risk Reduction Program

Weight Loss Reduction Program

W/u for non Ischemic chest Pain



CC; Joshua Hidalgo MD.

## 2017-12-18 NOTE — CARD
--------------- APPROVED REPORT --------------





EKG Measurement

Heart [id number]

[id number]

[id number]

[id number]

[id number]



<Conclusion>

Normal sinus rhythm

Right bundle branch block

STTW changes c/w ischemia, less pronounced c/w ECG 11/5/17

## 2017-12-18 NOTE — HP
REASON FOR ADMISSION:  Left heart cath, possible angioplasty, abnormal

stress test, unstable angina.



BRIEF CLINICAL HISTORY:  A 62-year-old male with past medical history

significant for diabetes, hypertension, hyperlipidemia, complaining of

chest pain, dyspnea on exertion.  Patient underwent a stress test that is

abnormal.  Patient developed tightness in the chest, done by Dr. Champion

dated 11/20/2017.  As I mentioned, Myoview stress test stopped after 4

minutes 35 seconds for Edward protocol due to tightness of the chest. 

Patient achieved 89% of predicted heart rate, ST segment depression 1.5 mm

in 2, 3 AVF and V2, V5 and V6 noted.  As mentioned by Dr. Champion, classical

angina, but the nuclear component scan was negative.  The patient was

recently seen in office as the patient was scheduled last week for cardiac

catheterization, but the insurance disapproved.  The patient was seen last

week with her daughter complaining of chest pain on walking 2 blocks

associated with shortness of breath and released by stopping walking.



PAST MEDICAL HISTORY:  Significant for diabetes, hypertension,

hyperlipidemia.



SOCIAL HISTORY:  Denies smoking.  Denies any history of alcohol abuse.



CURRENT MEDICATIONS:  Patient is taking metformin 1 g twice a day,

clopidogrel 75 mg daily, atorvastatin 20 mg daily, aspirin 81 mg daily,

omeprazole 40 mg daily, lisinopril 20 mg daily, ferrous sulfate 325 mg

daily.



REVIEW OF SYSTEMS:  As per HPI.



PHYSICAL EXAMINATION

VITAL SIGNS:  As follows; height of the patient is 5 feet 6 inches, weight

of the patient is 165, body mass index 26.5 kg/m2.  Rest of the examination

as follows; temperature afebrile, heart rate 66, and blood pressure 150/70.

HEENT:  PERRLA.  Extraocular muscles are Intact.

NECK:  Supple.  No carotid bruits or thyromegaly.

CHEST:  Clear to auscultation.

HEART:  S1 and S2 regular.

ABDOMEN:  Soft.

EXTREMITIES:  Clubbing and cyanosis negative.



LABORATORY DATA:  Blood workup pending.



IMPRESSION AND PLAN:  Unstable angina, abnormal stress test.  Patient is on

the treadmill developed after 4 minutes 30 seconds chest pain with 1.5 mm

ST depression, 2, 3 AVF, V5, V6.  Although, nuclear scan was positive the

patient has classical angina.  The patient was scheduled for elective

cardiac cath, possible angioplasty.  Further recommendations after the

cardiac catheterization.  We will follow with you.



Thank you, Dr. Austin for providing us the opportunity in taking care of

patient, Mor Almazan.





__________________________________________

Hanna Carmichael MD





DD:  12/15/2017 16:18:51

DT:  12/15/2017 18:58:23

Job # [id number]

## 2018-03-28 ENCOUNTER — HOSPITAL ENCOUNTER (EMERGENCY)
Dept: HOSPITAL 42 - ED | Age: 63
Discharge: HOME | End: 2018-03-28
Payer: MEDICAID

## 2018-03-28 VITALS — BODY MASS INDEX: 26.6 KG/M2

## 2018-03-28 DIAGNOSIS — F17.210: ICD-10-CM

## 2018-03-28 DIAGNOSIS — I10: ICD-10-CM
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
## 2018-03-28 DIAGNOSIS — M54.5: ICD-10-CM

## 2018-03-28 DIAGNOSIS — E11.9: ICD-10-CM

## 2018-03-28 DIAGNOSIS — S39.012A: Primary | ICD-10-CM

## 2018-03-28 LAB
ALBUMIN SERPL-MCNC: 4.1 G/DL (ref 3–4.8)
ALBUMIN/GLOB SERPL: 1.5 {RATIO} (ref 1.1–1.8)
ALT SERPL-CCNC: 28 U/L (ref 7–56)
APPEARANCE UR: CLEAR
AST SERPL-CCNC: 28 U/L (ref 17–59)
BASOPHILS # BLD AUTO: 0.01 K/MM3 (ref 0–2)
BASOPHILS NFR BLD: 0.1 % (ref 0–3)
BILIRUB UR-MCNC: NEGATIVE MG/DL
BUN SERPL-MCNC: 19 MG/DL (ref 7–21)
CALCIUM SERPL-MCNC: 10 MG/DL (ref 8.4–10.5)
COLOR UR: YELLOW
EOSINOPHIL # BLD: 0.3 10*3/UL (ref 0–0.7)
EOSINOPHIL NFR BLD: 3.6 % (ref 1.5–5)
ERYTHROCYTE [DISTWIDTH] IN BLOOD BY AUTOMATED COUNT: 12.8 % (ref 11.5–14.5)
GFR NON-AFRICAN AMERICAN: > 60
GLUCOSE UR STRIP-MCNC: NEGATIVE MG/DL
GRANULOCYTES # BLD: 5.42 10*3/UL (ref 1.4–6.5)
GRANULOCYTES NFR BLD: 63.4 % (ref 50–68)
HGB BLD-MCNC: 13.1 G/DL (ref 14–18)
LEUKOCYTE ESTERASE UR-ACNC: NEGATIVE LEU/UL
LYMPHOCYTES # BLD: 2.4 10*3/UL (ref 1.2–3.4)
LYMPHOCYTES NFR BLD AUTO: 27.6 % (ref 22–35)
MCH RBC QN AUTO: 28.2 PG (ref 25–35)
MCHC RBC AUTO-ENTMCNC: 32.4 G/DL (ref 31–37)
MCV RBC AUTO: 86.9 FL (ref 80–105)
MONOCYTES # BLD AUTO: 0.5 10*3/UL (ref 0.1–0.6)
MONOCYTES NFR BLD: 5.3 % (ref 1–6)
PH UR STRIP: 6 [PH] (ref 4.7–8)
PLATELET # BLD: 295 10^3/UL (ref 120–450)
PMV BLD AUTO: 9.6 FL (ref 7–11)
PROT UR STRIP-MCNC: NEGATIVE MG/DL
RBC # BLD AUTO: 4.65 10^6/UL (ref 3.5–6.1)
RBC # UR STRIP: NEGATIVE /UL
SP GR UR STRIP: >= 1.03 (ref 1–1.03)
UROBILINOGEN UR STRIP-ACNC: 0.2 E.U./DL
WBC # BLD AUTO: 8.6 10^3/UL (ref 4.5–11)

## 2018-03-28 NOTE — ED PDOC
Arrival/HPI





- General


Chief Complaint: Back Pain


Time Seen by Provider: 03/28/18 14:51


Historian: Patient





- History of Present Illness


Narrative History of Present Illness (Text): 


03/28/18 14:56


A 62 year old male, whose past medical history includes insulin dependent 

diabetes and hypertension, presents to the emergency department complaining of 

lower back pain, right worse than left. Patient notes radiating pain down right 

groin and leg. Patient reports he works as a  which involves a lot of 

heavy lifting. Patient denies any trauma, injury, fever, chills, nausea, 

vomiting, abdominal pain, chest pain, shortness of breath or any other 

complaints. 


 


Quality: Other


Context: Home, Work





Past Medical History





- Provider Review


Nursing Documentation Reviewed: Yes





- Infectious Disease


Hx of Infectious Diseases: None





- Tetanus Immunization


Tetanus Immunization: Unknown





- Cardiac


Hx Cardiac Disorders: Yes


Hx Hypertension: Yes





- Pulmonary


Hx Respiratory Disorders: Yes


Hx Asthma: Yes


Hx Chronic Obstructive Pulmonary Disease (COPD): Yes





- Neurological


Hx Neurological Disorder: No





- HEENT


Hx HEENT Disorder: Yes


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





- Renal


Hx Renal Disorder: No





- Endocrine/Metabolic


Hx Endocrine Disorders: Yes


Hx Diabetes Mellitus Type 2: Yes (iddm)





- Hematological/Oncological


Hx Blood Disorders: Yes


Hx Anemia: Yes


Hx Blood Transfusions: Yes





- Integumentary


Hx Dermatological Disorder: No





- Musculoskeletal/Rheumatological


Hx Musculoskeletal Disorders: Yes


Hx Arthritis: Yes





- Gastrointestinal


Hx Gastrointestinal Disorders: Yes


Hx Gastroesophageal Reflux: Yes





- Genitourinary/Gynecological


Hx Genitourinary Disorders: No





- Psychiatric


Hx Psychophysiologic Disorder: No


Hx Substance Use: No





- Surgical History


Other/Comment: CARDIAC CATH





- Anesthesia


Hx Anesthesia Reactions: No


Hx Malignant Hyperthermia: No





- Suicidal Assessment


Feels Threatened In Home Enviroment: No





Family/Social History





- Physician Review


Nursing Documentation Reviewed: Yes


Family/Social History: No Known Family HX


Smoking Status: Light Smoker < 10 Cigarettes Daily


Hx Alcohol Use: No


Hx Substance Use: No


Hx Substance Use Treatment: No





Allergies/Home Meds


Allergies/Adverse Reactions: 


Allergies





No Known Allergies Allergy (Verified 03/28/18 14:37)


 








Home Medications: 


 Home Meds











 Medication  Instructions  Recorded  Confirmed


 


Ferrous Sulfate [Feosol] 325 mg PO TID 11/22/17 03/28/18


 


Metformin HCl [Glucophage] 1,000 mg PO BID 12/18/17 03/28/18














Review of Systems





- Physician Review


All systems were reviewed & negative as marked: Yes





- Review of Systems


Constitutional: absent: Fevers, Night Sweats


Respiratory: absent: SOB


Cardiovascular: absent: Chest Pain


Gastrointestinal: absent: Abdominal Pain, Nausea, Vomiting


Musculoskeletal: Back Pain (lower back pain, right worse than left)





Physical Exam


Vital Signs Reviewed: Yes


Vital Signs











  Temp Pulse Resp BP Pulse Ox


 


 03/28/18 14:39  97.8 F  81  16  115/66  96











Temperature: Afebrile


Blood Pressure: Normal


Pulse: Regular


Respiratory Rate: Normal


Appearance: Positive for: Well-Appearing, Non-Toxic, Comfortable


Pain Distress: None


Mental Status: Positive for: Alert and Oriented X 3





- Systems Exam


Head: Present: Atraumatic, Normocephalic


Pupils: Present: PERRL


Extroacular Muscles: Present: EOMI


Conjunctiva: Present: Normal


Mouth: Present: Moist Mucous Membranes


Neck: Present: Normal Range of Motion


Respiratory/Chest: Present: Clear to Auscultation, Good Air Exchange.  No: 

Respiratory Distress, Accessory Muscle Use


Cardiovascular: Present: Regular Rate and Rhythm, Normal S1, S2.  No: Murmurs


Abdomen: Present: Tenderness (to right lower quadrant and suprapubic region), 

Normal Bowel Sounds.  No: Distention, Peritoneal Signs, Rebound, Guarding


Back: Present: Other (tenderness to right sciatic knotch).  No: Midline 

Tenderness, Paraspinal Tenderness


Upper Extremity: Present: Normal Inspection, Normal ROM, NORMAL PULSES.  No: 

Cyanosis, Edema


Lower Extremity: Present: Normal Inspection, NORMAL PULSES, Normal ROM.  No: 

Edema, CALF TENDERNESS


Neurological: Present: GCS=15, CN II-XII Intact, Speech Normal


Skin: Present: Warm, Dry, Normal Color.  No: Rashes


Psychiatric: Present: Alert, Oriented x 3, Normal Insight, Normal Concentration





Medical Decision Making


ED Course and Treatment: 


03/28/18 14:56


Impression:


A 62 year old male with right lower back pain radiating down groin and leg





Plan:


-- Abdomen and pelvis CT


-- Lumbar spine CT


-- Labs


-- Urinalysis 


-- Zofran and Percocet


-- Reassess and disposition





Progress Notes:


Report Date : 03/28/2018 15:51:24


PROCEDURE:  CT Abdomen and Pelvis without intravenous contrast


Dictator : Gatito Walton MD


IMPRESSION: No significant or acute  findings to account for/ related to the 

clinical presentation. No significant interval change compared to the prior 

examination(s). Additional benign and/or incidental findings described above.





Report Date : 03/28/2018 16:04:17


PROCEDURE:  CT Lumbar Spine without contrast


Dictator : Gatito Walton MD


IMPRESSION: No acute findings related to/accounting  for the clinical 

presentation. Multilevel degenerative changes.








- Lab Interpretations


Lab Results: 








 03/28/18 15:40 





 03/28/18 15:40 





 Lab Results





03/28/18 16:18: Urine Color Yellow, Urine Appearance Clear, Urine pH 6.0, Ur 

Specific Gravity >= 1.030, Urine Protein Negative, Urine Glucose (UA) Negative, 

Urine Ketones Negative, Urine Blood Negative, Urine Nitrate Negative, Urine 

Bilirubin Negative, Urine Urobilinogen 0.2, Ur Leukocyte Esterase Negative


03/28/18 15:40: Sodium 141, Potassium 4.6, Chloride 103, Carbon Dioxide 27, 

Anion Gap 16, BUN 19, Creatinine 0.8, Est GFR (African Amer) > 60, Est GFR (Non-

Af Amer) > 60, Random Glucose 110, Calcium 10.0, Total Bilirubin 0.2, AST 28, 

ALT 28, Alkaline Phosphatase 55, Total Protein 6.9, Albumin 4.1, Globulin 2.8, 

Albumin/Globulin Ratio 1.5


03/28/18 15:40: WBC 8.6  D, RBC 4.65, Hgb 13.1 L, Hct 40.4 L, MCV 86.9, MCH 28.2

, MCHC 32.4, RDW 12.8, Plt Count 295, MPV 9.6, Gran % 63.4, Lymph % (Auto) 27.6

, Mono % (Auto) 5.3, Eos % (Auto) 3.6, Baso % (Auto) 0.1, Gran # 5.42, Lymph # (

Auto) 2.4, Mono # (Auto) 0.5, Eos # (Auto) 0.3, Baso # (Auto) 0.01








I have reviewed the lab results: Yes





- RAD Interpretation


Radiology Orders: 








03/28/18 14:56


ABD & PELVIS W/O PO OR IV CONT [CT] Stat 


LUMBAR SPINE W/O CONTRAST [CT] Stat 














- Medication Orders


Current Medication Orders: 











Discontinued Medications





Ondansetron HCl (Zofran Odt)  4 mg PO STAT STA


   Stop: 03/28/18 14:59


   Last Admin: 03/28/18 15:08  Dose: 4 mg





Oxycodone/Acetaminophen (Percocet 5/325 Mg Tab)  2 tab PO STAT STA


   Stop: 03/28/18 14:59


   Last Admin: 03/28/18 15:08  Dose: 2 tab





MAR Pain Assessment


 Document     03/28/18 15:08  CMA  (Rec: 03/28/18 15:09  CMA  [id number])


     Pain Reassessment


      Is this a pain reassessment?               Yes














- Scribe Statement


The provider has reviewed the documentation as recorded by the Scribe


Adelina Goodwin





Provider Scribe Attestation:


All medical record entries made by the Scribe were at my direction and 

personally dictated by me. I have reviewed the chart and agree that the record 

accurately reflects my personal performance of the history, physical exam, 

medical decision making, and the department course for this patient. I have 

also personally directed, reviewed, and agree with the discharge instructions 

and disposition.








Disposition/Present on Arrival





- Present on Arrival


Any Indicators Present on Arrival: No


History of DVT/PE: No


History of Uncontrolled Diabetes: No


Urinary Catheter: No


History of Decub. Ulcer: No


History Surgical Site Infection Following: None





- Disposition


Have Diagnosis and Disposition been Completed?: Yes


Diagnosis: 


 Mechanical back pain, Lumbar strain, Low back pain, Diabetes, Hypertension





Disposition: HOME/ ROUTINE


Disposition Time: 16:41


Patient Plan: Discharge


Condition: GOOD


Discharge Instructions (ExitCare):  Low Back Pain  (DC), Muscle Strain (DC)


Additional Instructions: 


Mr Almazan -





All of your tests were normal-  This is just a low back strain.  Use the 

medicines a I prescribed them..... PERCOCET IS ONLY FOR REALLY BAD 

INCAPACITATING PAIN.  ZOFRAN IS FOR UPSET STOMACH FROM THE PERCOCET.  Motrin is 

for moderate pain and inflammation.  Follow up with your doctors, return to us 

if worse or new symptoms occur.





Cirilo-


Dr. Hima Ortiz


Forms:  [employer] (English)

## 2018-03-28 NOTE — CT
PROCEDURE:  CT Abdomen and Pelvis without intravenous contrast



HISTORY:

Right Flank Pain // Low Back Pain



COMPARISON:

08/20/2017 CT abdomen and pelvis. 



03/28/2018 CT lumbar spine 



TECHNIQUE:

Unenhanced study. Neither oral nor intravenous contrast administered. 



Radiation dose: 



Total exam DLP = 



Total exam DLP = 571.25 mGy-cm.



This CT exam was performed using one or more of the following dose 

reduction techniques: Automated exposure control, adjustment of the 

mA and/or kV according to patient size, and/or use of iterative 

reconstruction technique.



FINDINGS:



LOWER THORAX:

Unremarkable. 



LIVER:

Unremarkable. No gross lesion or ductal dilatation.  



GALLBLADDER AND BILE DUCTS:

Unremarkable. 



PANCREAS:

Unremarkable. No gross lesion or ductal dilatation.



SPLEEN:

Unremarkable. 



ADRENALS:

Unremarkable. No mass. 



KIDNEYS AND URETERS:

Unremarkable. No hydronephrosis. No solid mass. 



Incidental finding(s):



Stable cyst midpole region left kidney 3 cm in diameter 



VASCULATURE:

Unremarkable. No aortic aneurysm. 



BOWEL:

Constipation without fecal impaction or obstruction.  



APPENDIX:

No abnormalities to suggest acute appendicitis. No right lower 

quadrant inflammatory processes identified. 



PERITONEUM:

Unremarkable. No free fluid. No free air. 



LYMPH NODES:

Unremarkable. No enlarged lymph nodes. 



BLADDER:

Unremarkable. 



REPRODUCTIVE:

Unremarkable. 



BONES:

No acute fracture. 



OTHER FINDINGS:

None.



IMPRESSION:

No significant or acute  findings to account for/ related to the 

clinical presentation.



No significant interval change compared to the prior examination(s).



Additional benign and/or incidental findings described above.

## 2018-03-29 VITALS
SYSTOLIC BLOOD PRESSURE: 103 MMHG | RESPIRATION RATE: 18 BRPM | TEMPERATURE: 97.7 F | DIASTOLIC BLOOD PRESSURE: 75 MMHG | OXYGEN SATURATION: 95 % | HEART RATE: 62 BPM

## 2019-01-10 ENCOUNTER — HOSPITAL ENCOUNTER (EMERGENCY)
Dept: HOSPITAL 42 - ED | Age: 64
Discharge: LEFT BEFORE DISCHARGE | End: 2019-01-10
Payer: SELF-PAY

## 2019-01-10 VITALS — DIASTOLIC BLOOD PRESSURE: 81 MMHG | OXYGEN SATURATION: 94 % | HEART RATE: 88 BPM | SYSTOLIC BLOOD PRESSURE: 177 MMHG

## 2019-01-10 VITALS — TEMPERATURE: 98.6 F | RESPIRATION RATE: 18 BRPM

## 2019-01-10 VITALS — BODY MASS INDEX: 28.4 KG/M2

## 2019-01-10 DIAGNOSIS — R07.9: Primary | ICD-10-CM

## 2019-01-10 DIAGNOSIS — E11.9: ICD-10-CM

## 2019-01-10 DIAGNOSIS — I10: ICD-10-CM

## 2019-01-10 DIAGNOSIS — Z79.4: ICD-10-CM

## 2019-01-10 DIAGNOSIS — F17.210: ICD-10-CM

## 2019-01-10 LAB
ALBUMIN SERPL-MCNC: 4.2 G/DL (ref 3–4.8)
ALBUMIN/GLOB SERPL: 1.4 {RATIO} (ref 1.1–1.8)
ALT SERPL-CCNC: 36 U/L (ref 7–56)
APPEARANCE UR: CLEAR
APTT BLD: 28.3 SECONDS (ref 25.1–36.5)
AST SERPL-CCNC: 27 U/L (ref 17–59)
BASOPHILS # BLD AUTO: 0.01 K/MM3 (ref 0–2)
BASOPHILS NFR BLD: 0.1 % (ref 0–3)
BILIRUB UR-MCNC: NEGATIVE MG/DL
BNP SERPL-MCNC: 33.1 PG/ML (ref 0–450)
BUN SERPL-MCNC: 17 MG/DL (ref 7–21)
CALCIUM SERPL-MCNC: 9.8 MG/DL (ref 8.4–10.5)
COLOR UR: YELLOW
EOSINOPHIL # BLD: 0.3 10*3/UL (ref 0–0.7)
EOSINOPHIL NFR BLD: 3.8 % (ref 1.5–5)
ERYTHROCYTE [DISTWIDTH] IN BLOOD BY AUTOMATED COUNT: 13.6 % (ref 11.5–14.5)
GFR NON-AFRICAN AMERICAN: > 60
GLUCOSE UR STRIP-MCNC: >=1000 MG/DL
GRANULOCYTES # BLD: 4.38 10*3/UL (ref 1.4–6.5)
GRANULOCYTES NFR BLD: 57.8 % (ref 50–68)
HGB BLD-MCNC: 13.8 G/DL (ref 14–18)
INR PPP: 0.86
LEUKOCYTE ESTERASE UR-ACNC: NEGATIVE LEU/UL
LYMPHOCYTES # BLD: 2.3 10*3/UL (ref 1.2–3.4)
LYMPHOCYTES NFR BLD AUTO: 30.8 % (ref 22–35)
MCH RBC QN AUTO: 28.2 PG (ref 25–35)
MCHC RBC AUTO-ENTMCNC: 32 G/DL (ref 31–37)
MCV RBC AUTO: 88 FL (ref 80–105)
MONOCYTES # BLD AUTO: 0.6 10*3/UL (ref 0.1–0.6)
MONOCYTES NFR BLD: 7.5 % (ref 1–6)
PH UR STRIP: 6 [PH] (ref 4.7–8)
PLATELET # BLD: 323 10^3/UL (ref 120–450)
PMV BLD AUTO: 10.2 FL (ref 7–11)
PROT UR STRIP-MCNC: NEGATIVE MG/DL
PROTHROMBIN TIME: 9.8 SECONDS (ref 9.4–12.5)
RBC # BLD AUTO: 4.9 10^6/UL (ref 3.5–6.1)
RBC # UR STRIP: NEGATIVE /UL
SP GR UR STRIP: >= 1.03 (ref 1–1.03)
TROPONIN I SERPL-MCNC: < 0.01 NG/ML
UROBILINOGEN UR STRIP-ACNC: 0.2 E.U./DL
WBC # BLD AUTO: 7.6 10^3/UL (ref 4.5–11)

## 2019-01-10 PROCEDURE — 71045 X-RAY EXAM CHEST 1 VIEW: CPT

## 2019-01-10 PROCEDURE — 96375 TX/PRO/DX INJ NEW DRUG ADDON: CPT

## 2019-01-10 PROCEDURE — 99285 EMERGENCY DEPT VISIT HI MDM: CPT

## 2019-01-10 PROCEDURE — 96374 THER/PROPH/DIAG INJ IV PUSH: CPT

## 2019-01-10 PROCEDURE — 84484 ASSAY OF TROPONIN QUANT: CPT

## 2019-01-10 PROCEDURE — 83735 ASSAY OF MAGNESIUM: CPT

## 2019-01-10 PROCEDURE — 81003 URINALYSIS AUTO W/O SCOPE: CPT

## 2019-01-10 PROCEDURE — 85025 COMPLETE CBC W/AUTO DIFF WBC: CPT

## 2019-01-10 PROCEDURE — 85730 THROMBOPLASTIN TIME PARTIAL: CPT

## 2019-01-10 PROCEDURE — 85610 PROTHROMBIN TIME: CPT

## 2019-01-10 PROCEDURE — 80053 COMPREHEN METABOLIC PANEL: CPT

## 2019-01-10 PROCEDURE — 93005 ELECTROCARDIOGRAM TRACING: CPT

## 2019-01-10 PROCEDURE — 83880 ASSAY OF NATRIURETIC PEPTIDE: CPT

## 2019-01-10 NOTE — RAD
Date of service: 



01/10/2019



HISTORY:

 chest pain 



COMPARISON:

11/05/2017 



FINDINGS:



LUNGS:

No active pulmonary disease.



PLEURA:

No significant pleural effusion identified, no pneumothorax apparent.



CARDIOVASCULAR:

No aortic atherosclerotic calcification present.



Normal cardiac size. No pulmonary vascular congestion. 



OSSEOUS STRUCTURES:

No significant abnormalities.



VISUALIZED UPPER ABDOMEN:

Normal.



OTHER FINDINGS:

None.



IMPRESSION:

No active disease.

## 2019-01-10 NOTE — CARD
--------------- APPROVED REPORT --------------





Date of service: 01/10/2019



EKG Measurement

Heart [id number]

[id number]

[id number]

[id number]

[id number]



<Conclusion>

Normal sinus rhythm

Right bundle branch block

Abnormal ECG

## 2019-01-10 NOTE — ED PDOC
Arrival/HPI





- General


Chief Complaint: Chest Pain


Time Seen by Provider: 01/10/19 15:32


Historian: Patient, Family (daughter),  (Daughter)





- History of Present Illness


Narrative History of Present Illness (Text): 





01/10/19 15:48


A 62 y/o M w/ pmhx of stomach ulcers, asthma, gastritis, and a GI bleed presents

to the emergency department complaining of chest pain since last night. Patient 

reports experiencing associated chills, headache, dizziness, and shortness of 

breath. Patient's daughter reports patient's symptoms occurred last night when 

patient was watching tv. Per daughter, patient ha been having chest pain i

ntermittently for the past week and pain was significant this morning prompting 

a visit to the ER. Patient denies any fever, nausea, vomiting,  urinary 

symptoms, or any other complaints. 





PMD: Joshua Guy








Time/Duration: 24 hours (last night)


Symptom Onset: Gradual


Symptom Course: Unchanged


Activities at Onset: Light


Context: Home





Past Medical History





- Provider Review


Nursing Documentation Reviewed: Yes





- Infectious Disease


Hx of Infectious Diseases: None





- Tetanus Immunization


Tetanus Immunization: Unknown





- Cardiac


Hx Cardiac Disorders: Yes


Hx Hypertension: Yes





- Pulmonary


Hx Respiratory Disorders: Yes


Hx Asthma: Yes


Hx Chronic Obstructive Pulmonary Disease (COPD): Yes





- Neurological


Hx Neurological Disorder: No





- HEENT


Hx HEENT Disorder: Yes


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





- Renal


Hx Renal Disorder: No





- Endocrine/Metabolic


Hx Endocrine Disorders: Yes


Hx Diabetes Mellitus Type 2: Yes (iddm)





- Hematological/Oncological


Hx Blood Disorders: Yes


Hx Anemia: Yes


Hx Blood Transfusions: Yes





- Integumentary


Hx Dermatological Disorder: No





- Musculoskeletal/Rheumatological


Hx Musculoskeletal Disorders: Yes


Hx Arthritis: Yes





- Gastrointestinal


Hx Gastrointestinal Disorders: Yes


Hx Gastroesophageal Reflux: Yes





- Genitourinary/Gynecological


Hx Genitourinary Disorders: No





- Psychiatric


Hx Psychophysiologic Disorder: No


Hx Substance Use: No





- Surgical History


Other/Comment: CARDIAC CATH





- Anesthesia


Hx Anesthesia Reactions: No


Hx Malignant Hyperthermia: No





- Suicidal Assessment


Feels Threatened In Home Enviroment: No





Family/Social History





- Physician Review


Nursing Documentation Reviewed: Yes


Family/Social History: No Known Family HX


Smoking Status: Light Smoker < 10 Cigarettes Daily


Hx Alcohol Use: No


Hx Substance Use: No


Hx Substance Use Treatment: No





Allergies/Home Meds


Allergies/Adverse Reactions: 


Allergies





No Known Allergies Allergy (Verified 03/28/18 14:37)


   








Home Medications: 


                                    Home Meds











 Medication  Instructions  Recorded  Confirmed


 


Ferrous Sulfate [Feosol] 325 mg PO TID 11/22/17 03/28/18


 


Metformin HCl [Glucophage] 1,000 mg PO BID 12/18/17 03/28/18














Review of Systems





- Physician Review


All systems were reviewed & negative as marked: Yes





- Review of Systems


Constitutional: Other (chills).  absent: Fevers


Respiratory: SOB


Cardiovascular: Chest Pain


Gastrointestinal: absent: Nausea, Vomiting


Genitourinary Male: absent: Urinary Output Changes


Neurological: Headache, Dizziness





Physical Exam


Vital Signs Reviewed: Yes





Vital Signs











  Temp Pulse Resp BP Pulse Ox


 


 01/10/19 15:37  98.6 F  76  18  121/72  96











Temperature: Afebrile


Blood Pressure: Normal


Pulse: Regular


Respiratory Rate: Normal


Appearance: Positive for: Well-Appearing, Non-Toxic





- Systems Exam


Head: Present: Atraumatic, Normocephalic


Pupils: Present: PERRL


Extroacular Muscles: Present: EOMI


Conjunctiva: Present: Normal


Respiratory/Chest: Present: Wheezes (expiratory and inspiratory wheezing 

bilaterally)


Cardiovascular: Present: Regular Rate and Rhythm, Normal S1, S2.  No: Murmurs


Abdomen: Present: Tenderness (diffused tenderness (baseline)), Distention (soft 

belly, distended (baseline))


Back: Present: Normal Inspection


Upper Extremity: Present: Normal Inspection.  No: Cyanosis, Edema


Lower Extremity: Present: Normal Inspection.  No: Edema


Neurological: Present: GCS=15, CN II-XII Intact, Speech Normal


Skin: Present: Warm, Dry, Normal Color.  No: Rashes


Psychiatric: Present: Alert, Oriented x 3, Normal Insight, Normal Concentration





Medical Decision Making


ED Course and Treatment: 





01/10/19 15:52


Impression: 62 y/o M presents to the emergency department complaining of chest 

pain.





Differential Diagnosis included but are not limited to:  


- ACS


- Bronchitis


- Pneumonia 





Plan:


-- EKG


-- Labs


-- CBC


-- COAGs


-- Chest X-ray 


-- Duonebs


--SL NTG


--Toradol


-- Apresoline


-- Solumedrol


-- Urinalysis 


-- Reassess and disposition





Prior Visits:


Notes and results from previous visits were reviewed. 





Progress Notes:


01/10/19 18:45


Labs reveal negative troponin. Patient persistently complaining of chest pain. 

Nitroglycerin ordered. Patient advised to stay in hospital for continued 

monitoring in which he adamantly refuses. He wishes to sign out AMA. Risks of 

signout without completion of medical benefits fully explained to patient who 

does not wish to stay due to already missing 2 days of work. Consent forms 

signed.








- Lab Interpretations


Lab Results: 














                                 01/10/19 18:14 





                                 01/10/19 18:14 





                                   Lab Results





01/10/19 18:14: Sodium 142, Potassium 4.9, Chloride 105, Carbon Dioxide 30, 

Anion Gap 13, BUN 17, Creatinine 0.7 L, Est GFR ( Amer) > 60, Est GFR 

(Non-Af Amer) > 60, Random Glucose 256 H, Calcium 9.8, Magnesium 1.8, Total 

Bilirubin 0.4, AST 27, ALT 36, Alkaline Phosphatase 60, Troponin I < 0.01, 

NT-Pro-B Natriuret Pep 33.1, Total Protein 7.3, Albumin 4.2, Globulin 3.0, 

Albumin/Globulin Ratio 1.4


01/10/19 18:14: PT 9.8, INR 0.86, APTT 28.3


01/10/19 18:14: WBC 7.6, RBC 4.90, Hgb 13.8 L, Hct 43.1, MCV 88.0, MCH 28.2, 

MCHC 32.0, RDW 13.6, Plt Count 323, MPV 10.2, Gran % 57.8, Lymph % (Auto) 30.8, 

Mono % (Auto) 7.5 H, Eos % (Auto) 3.8, Baso % (Auto) 0.1, Gran # 4.38, Lymph # 

(Auto) 2.3, Mono # (Auto) 0.6, Eos # (Auto) 0.3, Baso # (Auto) 0.01


01/10/19 17:57: Urine Color Yellow, Urine Appearance Clear, Urine pH 6.0, Ur 

Specific Gravity >= 1.030, Urine Protein Negative, Urine Glucose (UA) >=1000, 

Urine Ketones Negative, Urine Blood Negative, Urine Nitrate Negative, Urine 

Bilirubin Negative, Urine Urobilinogen 0.2, Ur Leukocyte Esterase Negative








I have reviewed the lab results: Yes





- RAD Interpretation


Narrative RAD Interpretations (Text): 





01/10/19 18:42


Chest X-ray reviewed by radiologist, 


FINDINGS:


LUNGS:


No active pulmonary disease.


PLEURA:


No significant pleural effusion identified, no pneumothorax apparent.


CARDIOVASCULAR:


No aortic atherosclerotic calcification present.


Normal cardiac size. No pulmonary vascular congestion. 


OSSEOUS STRUCTURES:


No significant abnormalities.


VISUALIZED UPPER ABDOMEN:


Normal.


OTHER FINDINGS:


None.


IMPRESSION:


No active disease.





Radiology Orders: 











01/10/19 15:37


CHEST PORTABLE [RAD] Stat 











: Radiologist





- EKG Interpretation


EKG Interpretation (Text): 





01/10/19 18:43


EKG: Ordered, reviewed, and independently interpreted the EKG.


Rhythm : 71 NSR


Interpretation : RBBB, abnormal ECG





Interpreted by ED Physician: Yes


Type: 12 lead EKG





- Medication Orders


Current Medication Orders: 











Albuterol/Ipratropium (Duoneb 3 Mg/0.5 Mg (3 Ml) Ud)  3 ml IH STAT STA


   Stop: 01/10/19 15:48


Methylprednisolone (Solu-Medrol)  125 mg IVP STAT STA


   Stop: 01/10/19 15:48











- Scribe Statement


The provider has reviewed the documentation as recorded by the Brandyn Cortés





All medical record entries made by the Scribe were at my direction and 

personally dictated by me. I have reviewed the chart and agree that the record 

accurately reflects my personal performance of the history, physical exam, medic

al decision making, and the department course for this patient. I have also 

personally directed, reviewed, and agree with the discharge instructions and 

disposition.








Disposition/Present on Arrival





- Present on Arrival


Any Indicators Present on Arrival: No


History of DVT/PE: No


History of Uncontrolled Diabetes: No


Urinary Catheter: No


History of Decub. Ulcer: No


History Surgical Site Infection Following: None





- Disposition


Have Diagnosis and Disposition been Completed?: Yes


Diagnosis: 


 Chest pain





Disposition: AGAINST MEDICAL ADVICE


Disposition Time: 18:45


Condition: GUARDED


Discharge Instructions (ExitCare):  Chest Pain (ED)


Referrals: 


Franchesca Dias MD [Primary Care Provider] - Follow up with primary


Forms:  [employer] (English)

## 2022-09-09 NOTE — CT
PROCEDURE:  CT Lumbar Spine without contrast



HISTORY:

Right Flank Pain // Low Back Painwithout history of recent trauma



COMPARISON:

None.



TECHNIQUE:

Axial computed tomography images were obtained of the lumbar spine 

without the use of intravenous contrast. Coronal and sagittal 

reformatted images were created and reviewed. 



Radiation dose:



Total exam DLP = 69.34 mGy-cm.



This CT exam was performed using one or more of the following dose 

reduction techniques: Automated exposure control, adjustment of the 

mA and/or kV according to patient size, and/or use of iterative 

reconstruction technique.



FINDINGS:



VERTEBRAE:

Unremarkable. No fracture. Normal alignment. 



DISCS/SPINAL CANAL/NEURAL FORAMINA:

L1-2:    Degenerative change. No evidence of canal stenosis or focal 

disc.



L2-3:     Unremarkable.



L3-4:    Mild bulging annulus without focal herniated disc.



L4-5:    Bulging annulus fibrosus. Central hyper trophic bar without 

evidence of canal stenosis.



L5-S1:  Vacuum disc phenomenon. This includes air contained within 

the associated annular bulge.



PARASPINAL SOFT TISSUES:

Unremarkable. 



OTHER FINDINGS:

None. 



IMPRESSION:

No acute findings related to/accounting  for the clinical 

presentation.



Multilevel degenerative changes. Patient is sedated

## 2023-12-04 VITALS
OXYGEN SATURATION: 95 % | HEART RATE: 59 BPM | SYSTOLIC BLOOD PRESSURE: 157 MMHG | DIASTOLIC BLOOD PRESSURE: 72 MMHG | HEIGHT: 66 IN | RESPIRATION RATE: 16 BRPM | WEIGHT: 156.09 LBS | TEMPERATURE: 98 F

## 2023-12-04 RX ORDER — CLOPIDOGREL BISULFATE 75 MG/1
600 TABLET, FILM COATED ORAL ONCE
Refills: 0 | Status: COMPLETED | OUTPATIENT
Start: 2023-12-08 | End: 2023-12-08

## 2023-12-04 RX ORDER — CHLORHEXIDINE GLUCONATE 213 G/1000ML
1 SOLUTION TOPICAL ONCE
Refills: 0 | Status: DISCONTINUED | OUTPATIENT
Start: 2023-12-08 | End: 2023-12-23

## 2023-12-04 RX ORDER — ASPIRIN/CALCIUM CARB/MAGNESIUM 324 MG
325 TABLET ORAL ONCE
Refills: 0 | Status: COMPLETED | OUTPATIENT
Start: 2023-12-08 | End: 2023-12-08

## 2023-12-04 RX ORDER — SODIUM CHLORIDE 9 MG/ML
500 INJECTION INTRAMUSCULAR; INTRAVENOUS; SUBCUTANEOUS
Refills: 0 | Status: DISCONTINUED | OUTPATIENT
Start: 2023-12-08 | End: 2023-12-08

## 2023-12-04 RX ORDER — SODIUM CHLORIDE 9 MG/ML
250 INJECTION INTRAMUSCULAR; INTRAVENOUS; SUBCUTANEOUS ONCE
Refills: 0 | Status: COMPLETED | OUTPATIENT
Start: 2023-12-08 | End: 2023-12-08

## 2023-12-04 NOTE — H&P ADULT - NSICDXFAMILYHX_GEN_ALL_CORE_FT
FAMILY HISTORY:  No pertinent family history in first degree relatives FAMILY HISTORY:  Father  Still living? Unknown  Family history of early CAD, Age at diagnosis: Age Unknown    Child  Still living? Unknown  Family history of early CAD, Age at diagnosis: Age Unknown

## 2023-12-04 NOTE — H&P ADULT - ASSESSMENT
67 yo [place]an speaking M, [place] (hears R>L side), current every day smoker, PMHx HLD, DM2, Afib (on Eliquis, last dose 12/5/23), PAD s/p peripheral intervention 2022, renal artery stenosis (renal duplex revealed <60% stenosis of RRA), PVD, COPD who presented to Cardiologist Dr. Valadez c/o non-radiating SSCP described as heaviness with associated RUSS upon ambulation of 1-2 flights of stairs lasting few minutes before spontaneously resolving over past few months. Also endorsed associated orthopnea at night. TTE 10/28/23: LVEF 60-65%. In light of pt's risk factors, CCS Anginal class II/II Sx, abnormal NST, patient referred for cardiac cath with possible intervention to r/o underlying CAD.    -H/H = 14.2/44.3. Pt denies BRBPR, hematuria, hematochezia, melena. Pt loaded 325mg ASA x1 and Plavix 600mg x1  -Cr (per outpatient labs) = 0.7. EF normal. Euvolemic on exam. IVF with 250cc bolus x1 followed by NS @ 75cc/hr started pre procedure per protocol  -Type of sedation: moderate  -Candidate for sedation: yes     Risks & benefits of procedure and alternative therapy have been explained to the patient including but not limited to: allergic reaction, bleeding w/possible need for blood transfusion, infection, renal and vascular compromise, limb damage, arrhythmia, stroke, vessel dissection/perforation, Myocardial infarction, emergent CABG. Informed consent obtained and in chart.  69 yo Swazi speaking M, [place] (hears R>L side), current every day smoker, PMHx HLD, DM2, Afib (on Eliquis, last dose 12/5/23), PAD s/p peripheral intervention 2022, renal artery stenosis (renal duplex revealed <60% stenosis of RRA), PVD, COPD who presented to Cardiologist Dr. Valadez c/o non-radiating SSCP described as heaviness with associated RUSS upon ambulation of 1-2 flights of stairs lasting few minutes before spontaneously resolving over past few months. Also endorsed associated orthopnea at night. TTE 10/28/23: LVEF 60-65%. In light of pt's risk factors, CCS Anginal class II/II Sx, abnormal NST, patient referred for cardiac cath with possible intervention to r/o underlying CAD.    -H/H = 14.2/44.3. Pt denies BRBPR, hematuria, hematochezia, melena. Pt loaded 325mg ASA x1 and Plavix 600mg x1  -Cr (per outpatient labs) = 0.7. EF normal. Euvolemic on exam. IVF with 250cc bolus x1 followed by NS @ 75cc/hr started pre procedure per protocol  -Type of sedation: moderate  -Candidate for sedation: yes     Risks & benefits of procedure and alternative therapy have been explained to the patient including but not limited to: allergic reaction, bleeding w/possible need for blood transfusion, infection, renal and vascular compromise, limb damage, arrhythmia, stroke, vessel dissection/perforation, Myocardial infarction, emergent CABG. Informed consent obtained and in chart.  69 yo [place] speaking M, [place] (hears R>L side), current every day smoker, PMHx HLD, DM2, Afib (on Eliquis, last dose 12/5/23), PAD s/p peripheral intervention 2022, renal artery stenosis (renal duplex revealed <60% stenosis of RRA), PVD, COPD who presented to Cardiologist Dr. Valadez c/o non-radiating SSCP described as heaviness with associated RUSS upon ambulation of 1-2 flights of stairs lasting few minutes before spontaneously resolving over past few months. Also endorsed associated orthopnea at night. TTE 10/28/23: LVEF 60-65%. In light of pt's risk factors, CCS Anginal class II/II Sx, abnormal NST, patient referred for cardiac cath with possible intervention to r/o underlying CAD.    -H/H = 14.2/44.3. Pt denies BRBPR, hematuria, hematochezia, melena. Pt loaded 325mg ASA x1 and Plavix 600mg x1  -Cr (per outpatient labs) = 0.7. EF normal. Euvolemic on exam. IVF with 250cc bolus x1 followed by NS @ 75cc/hr started pre procedure per protocol  -K noted to be 5.5 on arrival. ASX without any ECG changes. Outpatient labs with K 5.1 (? aspect of baseline hyperkalemia). Ordered for Lokelma 10mg x1 pre cath   -Type of sedation: moderate  -Candidate for sedation: yes     Risks & benefits of procedure and alternative therapy have been explained to the patient including but not limited to: allergic reaction, bleeding w/possible need for blood transfusion, infection, renal and vascular compromise, limb damage, arrhythmia, stroke, vessel dissection/perforation, Myocardial infarction, emergent CABG. Informed consent obtained and in chart.  69 yo Armenian speaking M, [place] (hears R>L side), current every day smoker, PMHx HLD, DM2, Afib (on Eliquis, last dose 12/5/23), PAD s/p peripheral intervention 2022, renal artery stenosis (renal duplex revealed <60% stenosis of RRA), PVD, COPD who presented to Cardiologist Dr. Valadez c/o non-radiating SSCP described as heaviness with associated RUSS upon ambulation of 1-2 flights of stairs lasting few minutes before spontaneously resolving over past few months. Also endorsed associated orthopnea at night. TTE 10/28/23: LVEF 60-65%. In light of pt's risk factors, CCS Anginal class II/II Sx, abnormal NST, patient referred for cardiac cath with possible intervention to r/o underlying CAD.    -H/H = 14.2/44.3. Pt denies BRBPR, hematuria, hematochezia, melena. Pt loaded 325mg ASA x1 and Plavix 600mg x1  -Cr (per outpatient labs) = 0.7. EF normal. Euvolemic on exam. IVF with 250cc bolus x1 followed by NS @ 75cc/hr started pre procedure per protocol  -K noted to be 5.5 on arrival. ASX without any ECG changes. Outpatient labs with K 5.1 (? aspect of baseline hyperkalemia). Ordered for Lokelma 10mg x1 pre cath   -Type of sedation: moderate  -Candidate for sedation: yes     Risks & benefits of procedure and alternative therapy have been explained to the patient including but not limited to: allergic reaction, bleeding w/possible need for blood transfusion, infection, renal and vascular compromise, limb damage, arrhythmia, stroke, vessel dissection/perforation, Myocardial infarction, emergent CABG. Informed consent obtained and in chart.

## 2023-12-04 NOTE — H&P ADULT - HISTORY OF PRESENT ILLNESS
Cardiologist: Dr. Valadez  Escort:  Pharmacy:    69 yo Rwandan speaking M with HLD, DM2, Afib, PAD, PVD who presented to Cardiologist Dr. Valadez c/o non-radiating SSCP described as heaviness lasting few minutes before spontaneously resolving over past few months.  Cardiologist: Dr. Valadez  Escort:  Pharmacy:    69 yo [place] Islander speaking M with HLD, DM2, Afib, PAD, PVD who presented to Cardiologist Dr. Valadez c/o non-radiating SSCP described as heaviness lasting few minutes before spontaneously resolving over past few months.  Cardiologist: Dr. Valadez  Escort:  Pharmacy: [facility] [id number]    67 yo Hungarian speaking M, current every day smoker, PMHx HLD, DM2, Afib (on Eliquis, last dose ____), PAD s/p peripheral intervention 2022, renal artery stenosis (renal duplex revealed <60% stenosis of RRA), PVD, COPD who presented to Cardiologist Dr. Valadez c/o non-radiating SSCP described as heaviness with associated RUSS upon ambulation of 1-2 flights of stairs lasting few minutes before spontaneously resolving over past few months. Also endorsed associated orthopnea at night.  Denied palpitations, dizziness, syncope, PND, peripheral edema, fatigue, f/c.  NST 10/28/23: small amount of ischemia in the septal location which was reversible in the rest images.  TTE 10/28/23: LVEF 60-65%. In light of pt's risk factors, CCS Anginal class II/II Sx, abnormal NST, patient referred for cardiac cath with possible intervention to r/o underlying CAD.  Cardiologist: Dr. Valadez  Escort:  Pharmacy: [facility] [id number]    67 yo Belarusian speaking M, current every day smoker, PMHx HLD, DM2, Afib (on Eliquis, last dose ____), PAD s/p peripheral intervention 2022, renal artery stenosis (renal duplex revealed <60% stenosis of RRA), PVD, COPD who presented to Cardiologist Dr. Valadez c/o non-radiating SSCP described as heaviness with associated RUSS upon ambulation of 1-2 flights of stairs lasting few minutes before spontaneously resolving over past few months. Also endorsed associated orthopnea at night.  Denied palpitations, dizziness, syncope, PND, peripheral edema, fatigue, f/c.  NST 10/28/23: small amount of ischemia in the septal location which was reversible in the rest images.  TTE 10/28/23: LVEF 60-65%. In light of pt's risk factors, CCS Anginal class II/II Sx, abnormal NST, patient referred for cardiac cath with possible intervention to r/o underlying CAD.  Cardiologist: Dr. Valadez  Escort: Wife  Pharmacy: [facility] [id number]    67 yo Austrian speaking M, [place] (hears R>L side), current every day smoker, PMHx HLD, DM2, Afib (on Eliquis, last dose 12/5/23), PAD s/p peripheral intervention 2022, renal artery stenosis (renal duplex revealed <60% stenosis of RRA), PVD, COPD who presented to Cardiologist Dr. Valadez c/o non-radiating SSCP described as heaviness with associated RUSS upon ambulation of 1-2 flights of stairs lasting few minutes before spontaneously resolving over past few months. Also endorsed associated orthopnea at night.  Denied palpitations, dizziness, syncope, PND, peripheral edema, fatigue, f/c.  NST 10/28/23: small amount of ischemia in the septal location which was reversible in the rest images. TTE 10/28/23: LVEF 60-65%. In light of pt's risk factors, CCS Anginal class II/II Sx, abnormal NST, patient referred for cardiac cath with possible intervention to r/o underlying CAD.  Cardiologist: Dr. Valadez  Escort: Wife  Pharmacy: [facility] [id number]    69 yo Croatian speaking M, [place] (hears R>L side), current every day smoker, PMHx HLD, DM2, Afib (on Eliquis, last dose 12/5/23), PAD s/p peripheral intervention 2022, renal artery stenosis (renal duplex revealed <60% stenosis of RRA), PVD, COPD who presented to Cardiologist Dr. Valadez c/o non-radiating SSCP described as heaviness with associated RUSS upon ambulation of 1-2 flights of stairs lasting few minutes before spontaneously resolving over past few months. Also endorsed associated orthopnea at night.  Denied palpitations, dizziness, syncope, PND, peripheral edema, fatigue, f/c.  NST 10/28/23: small amount of ischemia in the septal location which was reversible in the rest images. TTE 10/28/23: LVEF 60-65%. In light of pt's risk factors, CCS Anginal class II/II Sx, abnormal NST, patient referred for cardiac cath with possible intervention to r/o underlying CAD.

## 2023-12-04 NOTE — H&P ADULT - NSICDXPASTMEDICALHX_GEN_ALL_CORE_FT
PAST MEDICAL HISTORY:  Atrial fibrillation     Chronic obstructive pulmonary disease     DM (diabetes mellitus)     H/O renal artery stenosis     Hyperlipidemia     Hypertension     Peripheral arterial disease

## 2023-12-08 ENCOUNTER — OUTPATIENT (OUTPATIENT)
Dept: OUTPATIENT SERVICES | Facility: HOSPITAL | Age: 68
LOS: 1 days | Discharge: HOME | End: 2023-12-08
Payer: MEDICARE

## 2023-12-08 LAB
A1C WITH ESTIMATED AVERAGE GLUCOSE RESULT: 7.9 % — HIGH (ref 4–5.6)
A1C WITH ESTIMATED AVERAGE GLUCOSE RESULT: 7.9 % — HIGH (ref 4–5.6)
ALBUMIN SERPL ELPH-MCNC: 4.6 G/DL — SIGNIFICANT CHANGE UP (ref 3.3–5)
ALBUMIN SERPL ELPH-MCNC: 4.6 G/DL — SIGNIFICANT CHANGE UP (ref 3.3–5)
ALP SERPL-CCNC: 73 U/L — SIGNIFICANT CHANGE UP (ref 40–120)
ALP SERPL-CCNC: 73 U/L — SIGNIFICANT CHANGE UP (ref 40–120)
ALT FLD-CCNC: 15 U/L — SIGNIFICANT CHANGE UP (ref 10–45)
ALT FLD-CCNC: 15 U/L — SIGNIFICANT CHANGE UP (ref 10–45)
ANION GAP SERPL CALC-SCNC: 10 MMOL/L — SIGNIFICANT CHANGE UP (ref 5–17)
ANION GAP SERPL CALC-SCNC: 10 MMOL/L — SIGNIFICANT CHANGE UP (ref 5–17)
APTT BLD: 28.9 SEC — SIGNIFICANT CHANGE UP (ref 24.5–35.6)
APTT BLD: 28.9 SEC — SIGNIFICANT CHANGE UP (ref 24.5–35.6)
AST SERPL-CCNC: 20 U/L — SIGNIFICANT CHANGE UP (ref 10–40)
AST SERPL-CCNC: 20 U/L — SIGNIFICANT CHANGE UP (ref 10–40)
BASOPHILS # BLD AUTO: 0.03 K/UL — SIGNIFICANT CHANGE UP (ref 0–0.2)
BASOPHILS # BLD AUTO: 0.03 K/UL — SIGNIFICANT CHANGE UP (ref 0–0.2)
BASOPHILS NFR BLD AUTO: 0.4 % — SIGNIFICANT CHANGE UP (ref 0–2)
BASOPHILS NFR BLD AUTO: 0.4 % — SIGNIFICANT CHANGE UP (ref 0–2)
BILIRUB SERPL-MCNC: 0.3 MG/DL — SIGNIFICANT CHANGE UP (ref 0.2–1.2)
BILIRUB SERPL-MCNC: 0.3 MG/DL — SIGNIFICANT CHANGE UP (ref 0.2–1.2)
BUN SERPL-MCNC: 14 MG/DL — SIGNIFICANT CHANGE UP (ref 7–23)
BUN SERPL-MCNC: 14 MG/DL — SIGNIFICANT CHANGE UP (ref 7–23)
CALCIUM SERPL-MCNC: 9.5 MG/DL — SIGNIFICANT CHANGE UP (ref 8.4–10.5)
CALCIUM SERPL-MCNC: 9.5 MG/DL — SIGNIFICANT CHANGE UP (ref 8.4–10.5)
CHLORIDE SERPL-SCNC: 105 MMOL/L — SIGNIFICANT CHANGE UP (ref 96–108)
CHLORIDE SERPL-SCNC: 105 MMOL/L — SIGNIFICANT CHANGE UP (ref 96–108)
CHOLEST SERPL-MCNC: 139 MG/DL — SIGNIFICANT CHANGE UP
CHOLEST SERPL-MCNC: 139 MG/DL — SIGNIFICANT CHANGE UP
CK MB CFR SERPL CALC: 3.9 NG/ML — SIGNIFICANT CHANGE UP (ref 0–6.7)
CK MB CFR SERPL CALC: 3.9 NG/ML — SIGNIFICANT CHANGE UP (ref 0–6.7)
CK SERPL-CCNC: 136 U/L — SIGNIFICANT CHANGE UP (ref 30–200)
CK SERPL-CCNC: 136 U/L — SIGNIFICANT CHANGE UP (ref 30–200)
CO2 SERPL-SCNC: 28 MMOL/L — SIGNIFICANT CHANGE UP (ref 22–31)
CO2 SERPL-SCNC: 28 MMOL/L — SIGNIFICANT CHANGE UP (ref 22–31)
CREAT SERPL-MCNC: 0.65 MG/DL — SIGNIFICANT CHANGE UP (ref 0.5–1.3)
CREAT SERPL-MCNC: 0.65 MG/DL — SIGNIFICANT CHANGE UP (ref 0.5–1.3)
EGFR: 101 ML/MIN/1.73M2 — SIGNIFICANT CHANGE UP
EGFR: 101 ML/MIN/1.73M2 — SIGNIFICANT CHANGE UP
EOSINOPHIL # BLD AUTO: 0.29 K/UL — SIGNIFICANT CHANGE UP (ref 0–0.5)
EOSINOPHIL # BLD AUTO: 0.29 K/UL — SIGNIFICANT CHANGE UP (ref 0–0.5)
EOSINOPHIL NFR BLD AUTO: 3.8 % — SIGNIFICANT CHANGE UP (ref 0–6)
EOSINOPHIL NFR BLD AUTO: 3.8 % — SIGNIFICANT CHANGE UP (ref 0–6)
ESTIMATED AVERAGE GLUCOSE: 180 MG/DL — HIGH (ref 68–114)
ESTIMATED AVERAGE GLUCOSE: 180 MG/DL — HIGH (ref 68–114)
GLUCOSE BLDC GLUCOMTR-MCNC: 110 MG/DL — HIGH (ref 70–99)
GLUCOSE BLDC GLUCOMTR-MCNC: 110 MG/DL — HIGH (ref 70–99)
GLUCOSE BLDC GLUCOMTR-MCNC: 122 MG/DL — HIGH (ref 70–99)
GLUCOSE BLDC GLUCOMTR-MCNC: 122 MG/DL — HIGH (ref 70–99)
GLUCOSE SERPL-MCNC: 138 MG/DL — HIGH (ref 70–99)
GLUCOSE SERPL-MCNC: 138 MG/DL — HIGH (ref 70–99)
HCT VFR BLD CALC: 44.3 % — SIGNIFICANT CHANGE UP (ref 39–50)
HCT VFR BLD CALC: 44.3 % — SIGNIFICANT CHANGE UP (ref 39–50)
HDLC SERPL-MCNC: 50 MG/DL — SIGNIFICANT CHANGE UP
HDLC SERPL-MCNC: 50 MG/DL — SIGNIFICANT CHANGE UP
HGB BLD-MCNC: 14.2 G/DL — SIGNIFICANT CHANGE UP (ref 13–17)
HGB BLD-MCNC: 14.2 G/DL — SIGNIFICANT CHANGE UP (ref 13–17)
IMM GRANULOCYTES NFR BLD AUTO: 0.3 % — SIGNIFICANT CHANGE UP (ref 0–0.9)
IMM GRANULOCYTES NFR BLD AUTO: 0.3 % — SIGNIFICANT CHANGE UP (ref 0–0.9)
INR BLD: 0.94 — SIGNIFICANT CHANGE UP (ref 0.85–1.18)
INR BLD: 0.94 — SIGNIFICANT CHANGE UP (ref 0.85–1.18)
LIPID PNL WITH DIRECT LDL SERPL: 70 MG/DL — SIGNIFICANT CHANGE UP
LIPID PNL WITH DIRECT LDL SERPL: 70 MG/DL — SIGNIFICANT CHANGE UP
LYMPHOCYTES # BLD AUTO: 2.29 K/UL — SIGNIFICANT CHANGE UP (ref 1–3.3)
LYMPHOCYTES # BLD AUTO: 2.29 K/UL — SIGNIFICANT CHANGE UP (ref 1–3.3)
LYMPHOCYTES # BLD AUTO: 30.4 % — SIGNIFICANT CHANGE UP (ref 13–44)
LYMPHOCYTES # BLD AUTO: 30.4 % — SIGNIFICANT CHANGE UP (ref 13–44)
MAGNESIUM SERPL-MCNC: 1.9 MG/DL — SIGNIFICANT CHANGE UP (ref 1.6–2.6)
MAGNESIUM SERPL-MCNC: 1.9 MG/DL — SIGNIFICANT CHANGE UP (ref 1.6–2.6)
MCHC RBC-ENTMCNC: 26.8 PG — LOW (ref 27–34)
MCHC RBC-ENTMCNC: 26.8 PG — LOW (ref 27–34)
MCHC RBC-ENTMCNC: 32.1 GM/DL — SIGNIFICANT CHANGE UP (ref 32–36)
MCHC RBC-ENTMCNC: 32.1 GM/DL — SIGNIFICANT CHANGE UP (ref 32–36)
MCV RBC AUTO: 83.7 FL — SIGNIFICANT CHANGE UP (ref 80–100)
MCV RBC AUTO: 83.7 FL — SIGNIFICANT CHANGE UP (ref 80–100)
MONOCYTES # BLD AUTO: 0.59 K/UL — SIGNIFICANT CHANGE UP (ref 0–0.9)
MONOCYTES # BLD AUTO: 0.59 K/UL — SIGNIFICANT CHANGE UP (ref 0–0.9)
MONOCYTES NFR BLD AUTO: 7.8 % — SIGNIFICANT CHANGE UP (ref 2–14)
MONOCYTES NFR BLD AUTO: 7.8 % — SIGNIFICANT CHANGE UP (ref 2–14)
NEUTROPHILS # BLD AUTO: 4.32 K/UL — SIGNIFICANT CHANGE UP (ref 1.8–7.4)
NEUTROPHILS # BLD AUTO: 4.32 K/UL — SIGNIFICANT CHANGE UP (ref 1.8–7.4)
NEUTROPHILS NFR BLD AUTO: 57.3 % — SIGNIFICANT CHANGE UP (ref 43–77)
NEUTROPHILS NFR BLD AUTO: 57.3 % — SIGNIFICANT CHANGE UP (ref 43–77)
NON HDL CHOLESTEROL: 89 MG/DL — SIGNIFICANT CHANGE UP
NON HDL CHOLESTEROL: 89 MG/DL — SIGNIFICANT CHANGE UP
NRBC # BLD: 0 /100 WBCS — SIGNIFICANT CHANGE UP (ref 0–0)
NRBC # BLD: 0 /100 WBCS — SIGNIFICANT CHANGE UP (ref 0–0)
PLATELET # BLD AUTO: 358 K/UL — SIGNIFICANT CHANGE UP (ref 150–400)
PLATELET # BLD AUTO: 358 K/UL — SIGNIFICANT CHANGE UP (ref 150–400)
POTASSIUM SERPL-MCNC: 5.5 MMOL/L — HIGH (ref 3.5–5.3)
POTASSIUM SERPL-MCNC: 5.5 MMOL/L — HIGH (ref 3.5–5.3)
POTASSIUM SERPL-SCNC: 5.5 MMOL/L — HIGH (ref 3.5–5.3)
POTASSIUM SERPL-SCNC: 5.5 MMOL/L — HIGH (ref 3.5–5.3)
PROT SERPL-MCNC: 7.7 G/DL — SIGNIFICANT CHANGE UP (ref 6–8.3)
PROT SERPL-MCNC: 7.7 G/DL — SIGNIFICANT CHANGE UP (ref 6–8.3)
PROTHROM AB SERPL-ACNC: 10.7 SEC — SIGNIFICANT CHANGE UP (ref 9.5–13)
PROTHROM AB SERPL-ACNC: 10.7 SEC — SIGNIFICANT CHANGE UP (ref 9.5–13)
RBC # BLD: 5.29 M/UL — SIGNIFICANT CHANGE UP (ref 4.2–5.8)
RBC # BLD: 5.29 M/UL — SIGNIFICANT CHANGE UP (ref 4.2–5.8)
RBC # FLD: 13 % — SIGNIFICANT CHANGE UP (ref 10.3–14.5)
RBC # FLD: 13 % — SIGNIFICANT CHANGE UP (ref 10.3–14.5)
SODIUM SERPL-SCNC: 143 MMOL/L — SIGNIFICANT CHANGE UP (ref 135–145)
SODIUM SERPL-SCNC: 143 MMOL/L — SIGNIFICANT CHANGE UP (ref 135–145)
TRIGL SERPL-MCNC: 96 MG/DL — SIGNIFICANT CHANGE UP
TRIGL SERPL-MCNC: 96 MG/DL — SIGNIFICANT CHANGE UP
WBC # BLD: 7.54 K/UL — SIGNIFICANT CHANGE UP (ref 3.8–10.5)
WBC # BLD: 7.54 K/UL — SIGNIFICANT CHANGE UP (ref 3.8–10.5)
WBC # FLD AUTO: 7.54 K/UL — SIGNIFICANT CHANGE UP (ref 3.8–10.5)
WBC # FLD AUTO: 7.54 K/UL — SIGNIFICANT CHANGE UP (ref 3.8–10.5)

## 2023-12-08 PROCEDURE — 99152 MOD SED SAME PHYS/QHP 5/>YRS: CPT

## 2023-12-08 PROCEDURE — 80053 COMPREHEN METABOLIC PANEL: CPT

## 2023-12-08 PROCEDURE — 83735 ASSAY OF MAGNESIUM: CPT

## 2023-12-08 PROCEDURE — 85025 COMPLETE CBC W/AUTO DIFF WBC: CPT

## 2023-12-08 PROCEDURE — C1887: CPT

## 2023-12-08 PROCEDURE — 83036 HEMOGLOBIN GLYCOSYLATED A1C: CPT

## 2023-12-08 PROCEDURE — 80061 LIPID PANEL: CPT

## 2023-12-08 PROCEDURE — C1894: CPT

## 2023-12-08 PROCEDURE — C1769: CPT

## 2023-12-08 PROCEDURE — 93458 L HRT ARTERY/VENTRICLE ANGIO: CPT | Mod: 59

## 2023-12-08 PROCEDURE — 93458 L HRT ARTERY/VENTRICLE ANGIO: CPT | Mod: 26

## 2023-12-08 PROCEDURE — 82553 CREATINE MB FRACTION: CPT

## 2023-12-08 PROCEDURE — 82550 ASSAY OF CK (CPK): CPT

## 2023-12-08 PROCEDURE — 85730 THROMBOPLASTIN TIME PARTIAL: CPT

## 2023-12-08 PROCEDURE — 85610 PROTHROMBIN TIME: CPT

## 2023-12-08 PROCEDURE — 82962 GLUCOSE BLOOD TEST: CPT

## 2023-12-08 RX ORDER — EZETIMIBE 10 MG/1
1 TABLET ORAL
Refills: 0 | DISCHARGE

## 2023-12-08 RX ORDER — METOPROLOL TARTRATE 50 MG
1 TABLET ORAL
Refills: 0 | DISCHARGE

## 2023-12-08 RX ORDER — EMPAGLIFLOZIN 10 MG/1
1 TABLET, FILM COATED ORAL
Refills: 0 | DISCHARGE

## 2023-12-08 RX ORDER — ATORVASTATIN CALCIUM 80 MG/1
1 TABLET, FILM COATED ORAL
Refills: 0 | DISCHARGE

## 2023-12-08 RX ORDER — APIXABAN 2.5 MG/1
1 TABLET, FILM COATED ORAL
Refills: 0 | DISCHARGE

## 2023-12-08 RX ORDER — AMLODIPINE BESYLATE 2.5 MG/1
1 TABLET ORAL
Refills: 0 | DISCHARGE

## 2023-12-08 RX ORDER — METFORMIN HYDROCHLORIDE 850 MG/1
1 TABLET ORAL
Refills: 0 | DISCHARGE

## 2023-12-08 RX ORDER — SODIUM CHLORIDE 9 MG/ML
500 INJECTION INTRAMUSCULAR; INTRAVENOUS; SUBCUTANEOUS
Refills: 0 | Status: DISCONTINUED | OUTPATIENT
Start: 2023-12-08 | End: 2023-12-23

## 2023-12-08 RX ORDER — LISINOPRIL 2.5 MG/1
1 TABLET ORAL
Refills: 0 | DISCHARGE

## 2023-12-08 RX ORDER — SODIUM ZIRCONIUM CYCLOSILICATE 10 G/10G
10 POWDER, FOR SUSPENSION ORAL ONCE
Refills: 0 | Status: COMPLETED | OUTPATIENT
Start: 2023-12-08 | End: 2023-12-08

## 2023-12-08 RX ADMIN — SODIUM ZIRCONIUM CYCLOSILICATE 10 GRAM(S): 10 POWDER, FOR SUSPENSION ORAL at 15:37

## 2023-12-08 RX ADMIN — SODIUM CHLORIDE 500 MILLILITER(S): 9 INJECTION INTRAMUSCULAR; INTRAVENOUS; SUBCUTANEOUS at 14:24

## 2023-12-08 RX ADMIN — Medication 325 MILLIGRAM(S): at 14:24

## 2023-12-08 RX ADMIN — CLOPIDOGREL BISULFATE 600 MILLIGRAM(S): 75 TABLET, FILM COATED ORAL at 14:24

## 2023-12-08 RX ADMIN — SODIUM CHLORIDE 75 MILLILITER(S): 9 INJECTION INTRAMUSCULAR; INTRAVENOUS; SUBCUTANEOUS at 14:24

## 2023-12-08 NOTE — PROGRESS NOTE ADULT - SUBJECTIVE AND OBJECTIVE BOX
Interventional Cardiology  Post  Diagnostic Catheterization  Discharge Note      Patient without complaints. Ambulated and voided without difficulties    Afebrile, VSS    Ext:    		  		Right Radial : no hematoma,     bleeding, dressing; C/D/I      Pulses:    intact RAD to baseline     A/P: 69 yo Azeri speaking M, [place] (hears R>L side), current every day smoker, PMHx HLD, DM2, Afib (on Eliquis, last dose 12/5/23), PAD s/p peripheral intervention 2022, renal artery stenosis (renal duplex revealed <60% stenosis of RRA), PVD, COPD who is s/p [facility] 12/8/23 that revealed mid LAD 30%, LM, LAD, LCx and RCA with mild luminal irregularities. RCA dominant. EDP 11. R TR removed.     1. Follow-up with PMD/Cardiologist Dr. Valadez in 1week.   2. Pt given instructions on importance of post radial access site care. .    3. Stable for discharge as per attending Dr. Gunn after bed rest, pt voids, wrist stable and 30 minutes of ambulation.        Interventional Cardiology  Post  Diagnostic Catheterization  Discharge Note      Patient without complaints. Ambulated and voided without difficulties    Afebrile, VSS    Ext:    		  		Right Radial : no hematoma,     bleeding, dressing; C/D/I      Pulses:    intact RAD to baseline     A/P: 67 yo Turkish speaking M, [place] (hears R>L side), current every day smoker, PMHx HLD, DM2, Afib (on Eliquis, last dose 12/5/23), PAD s/p peripheral intervention 2022, renal artery stenosis (renal duplex revealed <60% stenosis of RRA), PVD, COPD who is s/p [facility] 12/8/23 that revealed mid LAD 30%, LM, LAD, LCx and RCA with mild luminal irregularities. RCA dominant. EDP 11. R TR removed.     1. Follow-up with PMD/Cardiologist Dr. Valadez in 1week.   2. Pt given instructions on importance of post radial access site care. .    3. Stable for discharge as per attending Dr. Gunn after bed rest, pt voids, wrist stable and 30 minutes of ambulation.

## 2023-12-14 DIAGNOSIS — R93.1 ABNORMAL FINDINGS ON DIAGNOSTIC IMAGING OF HEART AND CORONARY CIRCULATION: ICD-10-CM

## 2023-12-14 DIAGNOSIS — I25.118 ATHEROSCLEROTIC HEART DISEASE OF NATIVE CORONARY ARTERY WITH OTHER FORMS OF ANGINA PECTORIS: ICD-10-CM
